# Patient Record
Sex: FEMALE | Race: WHITE | NOT HISPANIC OR LATINO | Employment: STUDENT | URBAN - METROPOLITAN AREA
[De-identification: names, ages, dates, MRNs, and addresses within clinical notes are randomized per-mention and may not be internally consistent; named-entity substitution may affect disease eponyms.]

---

## 2023-03-20 PROBLEM — N39.0 BACTERIAL UTI: Status: ACTIVE | Noted: 2023-03-20

## 2023-03-20 PROBLEM — R09.81 NASAL SINUS CONGESTION: Status: ACTIVE | Noted: 2023-03-20

## 2023-03-20 PROBLEM — N93.9 EPISODE OF HEAVY VAGINAL BLEEDING: Status: ACTIVE | Noted: 2023-03-20

## 2023-03-20 PROBLEM — S99.912A ANKLE INJURY, LEFT, INITIAL ENCOUNTER: Status: ACTIVE | Noted: 2023-03-20

## 2023-03-20 PROBLEM — R30.0 DYSURIA: Status: ACTIVE | Noted: 2023-03-20

## 2023-03-20 PROBLEM — H57.02 ANISOCORIA: Status: ACTIVE | Noted: 2023-03-20

## 2023-03-20 PROBLEM — H53.9 VISUAL DISTURBANCES: Status: ACTIVE | Noted: 2023-03-20

## 2023-03-20 PROBLEM — S93.409A ANKLE SPRAIN: Status: ACTIVE | Noted: 2023-03-20

## 2023-03-20 PROBLEM — K58.9 IBS (IRRITABLE BOWEL SYNDROME): Status: ACTIVE | Noted: 2023-03-20

## 2023-03-20 PROBLEM — R35.0 INCREASED FREQUENCY OF URINATION: Status: ACTIVE | Noted: 2023-03-20

## 2023-03-20 PROBLEM — H47.322 DRUSEN OF LEFT OPTIC DISC: Status: ACTIVE | Noted: 2023-03-20

## 2023-03-20 PROBLEM — R39.15 URINARY URGENCY: Status: ACTIVE | Noted: 2023-03-20

## 2023-03-20 PROBLEM — R39.9 UTI SYMPTOMS: Status: ACTIVE | Noted: 2023-03-20

## 2023-03-20 PROBLEM — A49.9 BACTERIAL UTI: Status: ACTIVE | Noted: 2023-03-20

## 2023-03-20 RX ORDER — BUPROPION HYDROCHLORIDE 150 MG/1
TABLET ORAL
COMMUNITY
Start: 2021-09-29 | End: 2023-08-16 | Stop reason: ALTCHOICE

## 2023-03-20 RX ORDER — CETIRIZINE HYDROCHLORIDE 10 MG/1
1 TABLET ORAL NIGHTLY
COMMUNITY
Start: 2020-08-31

## 2023-03-20 RX ORDER — FOLIC ACID 1 MG/1
TABLET ORAL
COMMUNITY

## 2023-03-20 RX ORDER — TOPIRAMATE 25 MG/1
2 TABLET ORAL 2 TIMES DAILY
COMMUNITY
Start: 2021-04-07 | End: 2023-08-16 | Stop reason: SDUPTHER

## 2023-03-20 RX ORDER — FLUOXETINE HYDROCHLORIDE 20 MG/1
1 CAPSULE ORAL DAILY
COMMUNITY
End: 2023-10-27 | Stop reason: ALTCHOICE

## 2023-03-20 RX ORDER — AZELASTINE 1 MG/ML
SPRAY, METERED NASAL
COMMUNITY
Start: 2020-07-15 | End: 2023-04-25

## 2023-03-20 RX ORDER — FLUTICASONE PROPIONATE 50 MCG
SPRAY, SUSPENSION (ML) NASAL
COMMUNITY
Start: 2021-07-19

## 2023-03-20 RX ORDER — SEGESTERONE ACETATE AND ETHINYL ESTRADIOL 103; 17.4 MG/1; MG/1
RING VAGINAL
COMMUNITY
Start: 2021-09-14 | End: 2024-05-09 | Stop reason: WASHOUT

## 2023-03-20 RX ORDER — TRAZODONE HYDROCHLORIDE 300 MG/1
TABLET ORAL
COMMUNITY

## 2023-04-21 DIAGNOSIS — J06.9 ACUTE URI: ICD-10-CM

## 2023-04-24 ENCOUNTER — TELEMEDICINE (OUTPATIENT)
Dept: PRIMARY CARE | Facility: CLINIC | Age: 28
End: 2023-04-24
Payer: COMMERCIAL

## 2023-04-24 DIAGNOSIS — H10.32 ACUTE CONJUNCTIVITIS OF LEFT EYE, UNSPECIFIED ACUTE CONJUNCTIVITIS TYPE: Primary | ICD-10-CM

## 2023-04-24 PROCEDURE — 99213 OFFICE O/P EST LOW 20 MIN: CPT | Performed by: STUDENT IN AN ORGANIZED HEALTH CARE EDUCATION/TRAINING PROGRAM

## 2023-04-24 RX ORDER — TOBRAMYCIN 3 MG/ML
1 SOLUTION/ DROPS OPHTHALMIC 4 TIMES DAILY
Qty: 5 ML | Refills: 0 | Status: SHIPPED | OUTPATIENT
Start: 2023-04-24 | End: 2023-08-16 | Stop reason: ALTCHOICE

## 2023-04-24 NOTE — PROGRESS NOTES
"Subjective   Patient ID: Holli Bales is a 27 y.o. female who presents for Conjunctivitis.    HPI   Over the past 24 hours she started to notice left eye irritation  This morning she noted severe redness within her conjunctiva of the left eye as well as her eye being \"crusted shut \"    Stated that she did sleep in her contacts on Saturday night and has had some minor irritation    Overall, denies any difficulty seeing or any other acute signs/symptoms    Asking to be further evaluate/treated as she believes this is slightly worsening    Review of Systems  All pertinent positive symptoms are included in the history of present illness.    All other systems have been reviewed and are negative and noncontributory to this patient's current ailments.    Objective   There were no vitals taken for this visit.    Physical Exam  CONSTITUTIONAL - well nourished, well developed, looks like stated age, in no acute distress, not ill-appearing, and not tired appearing  SKIN - normal skin color and pigmentation, normal skin turgor without rash, lesions, or nodules visualized  HEAD - no trauma, normocephalic  EYES - normal external exam but conjunctiva of left eye very erythematous/irritated, noted some minor drainage and minor crusting  CHEST -no distressed breathing, good effort  EXTREMITIES - no edema, no deformities  NEUROLOGICAL - normal balance, normal motor, no ataxia  PSYCHIATRIC - alert, pleasant and cordial, age-appropriate    Assessment/Plan   Due to your appearance and your signs/symptoms I did provide tobramycin to be taken 4 times daily for the next 7-10 days    Please perform this at least 1 day after you are fully healed    Risks, benefits, and options of medication(s) above were discussed with instructions on the medication usage for underlying medical ailment(s)    I suggested changing pillow linens for at least the next 2 nights, making certain that proper hygiene is followed including washing of the hands, and " using medication as instructed    I encouraged supportive care such as rest, fluids and Advil/Tylenol as warranted    Notify me in 3 to 4 days or sooner if symptoms worsen or persist as we will then consider ophthalmology consultation

## 2023-04-25 RX ORDER — AZELASTINE 1 MG/ML
1 SPRAY, METERED NASAL 2 TIMES DAILY
Qty: 12 ML | Refills: 0 | Status: SHIPPED | OUTPATIENT
Start: 2023-04-25 | End: 2024-04-18

## 2023-08-12 ASSESSMENT — PROMIS GLOBAL HEALTH SCALE
EMOTIONAL_PROBLEMS: OFTEN
RATE_AVERAGE_PAIN: 3
RATE_SOCIAL_SATISFACTION: VERY GOOD
CARRYOUT_PHYSICAL_ACTIVITIES: MOSTLY
RATE_GENERAL_HEALTH: GOOD
CARRYOUT_SOCIAL_ACTIVITIES: VERY GOOD
RATE_QUALITY_OF_LIFE: GOOD
RATE_AVERAGE_FATIGUE: SEVERE
RATE_PHYSICAL_HEALTH: GOOD
RATE_MENTAL_HEALTH: FAIR

## 2023-08-16 ENCOUNTER — OFFICE VISIT (OUTPATIENT)
Dept: PRIMARY CARE | Facility: CLINIC | Age: 28
End: 2023-08-16
Payer: COMMERCIAL

## 2023-08-16 VITALS
BODY MASS INDEX: 20.69 KG/M2 | DIASTOLIC BLOOD PRESSURE: 64 MMHG | HEART RATE: 73 BPM | HEIGHT: 65 IN | SYSTOLIC BLOOD PRESSURE: 96 MMHG | WEIGHT: 124.2 LBS

## 2023-08-16 DIAGNOSIS — G43.109 MIGRAINE WITH AURA AND WITHOUT STATUS MIGRAINOSUS, NOT INTRACTABLE: ICD-10-CM

## 2023-08-16 DIAGNOSIS — F33.1 MAJOR DEPRESSIVE DISORDER, RECURRENT, MODERATE (MULTI): Chronic | ICD-10-CM

## 2023-08-16 DIAGNOSIS — Z00.00 HEALTHCARE MAINTENANCE: Primary | ICD-10-CM

## 2023-08-16 LAB
POC APPEARANCE, URINE: CLEAR
POC BILIRUBIN, URINE: NEGATIVE
POC BLOOD, URINE: NEGATIVE
POC COLOR, URINE: NORMAL
POC GLUCOSE, URINE: NEGATIVE MG/DL
POC KETONES, URINE: NEGATIVE MG/DL
POC LEUKOCYTES, URINE: NEGATIVE
POC NITRITE,URINE: NEGATIVE
POC PH, URINE: 6 PH
POC PROTEIN, URINE: NEGATIVE MG/DL
POC SPECIFIC GRAVITY, URINE: 1.02
POC UROBILINOGEN, URINE: 0.2 EU/DL

## 2023-08-16 PROCEDURE — 1036F TOBACCO NON-USER: CPT | Performed by: STUDENT IN AN ORGANIZED HEALTH CARE EDUCATION/TRAINING PROGRAM

## 2023-08-16 PROCEDURE — 99395 PREV VISIT EST AGE 18-39: CPT | Performed by: STUDENT IN AN ORGANIZED HEALTH CARE EDUCATION/TRAINING PROGRAM

## 2023-08-16 PROCEDURE — 81002 URINALYSIS NONAUTO W/O SCOPE: CPT | Performed by: STUDENT IN AN ORGANIZED HEALTH CARE EDUCATION/TRAINING PROGRAM

## 2023-08-16 PROCEDURE — 99213 OFFICE O/P EST LOW 20 MIN: CPT | Performed by: STUDENT IN AN ORGANIZED HEALTH CARE EDUCATION/TRAINING PROGRAM

## 2023-08-16 RX ORDER — TOPIRAMATE 50 MG/1
50 TABLET, FILM COATED ORAL 2 TIMES DAILY
Qty: 180 TABLET | Refills: 1 | Status: SHIPPED | OUTPATIENT
Start: 2023-08-16 | End: 2024-01-05 | Stop reason: SDUPTHER

## 2023-08-16 RX ORDER — BUPROPION HYDROCHLORIDE 300 MG/1
1 TABLET ORAL
COMMUNITY
Start: 2023-02-17

## 2023-08-16 RX ORDER — FLUOXETINE 10 MG/1
10 CAPSULE ORAL
COMMUNITY
Start: 2023-04-20 | End: 2023-10-27 | Stop reason: ALTCHOICE

## 2023-08-16 NOTE — PROGRESS NOTES
Subjective   Patient ID: Holli Bales is a 27 y.o. female who presents for Annual Exam.  Today she is accompanied by alone.     HPI  1.  Health maintenance  Overall patient is doing well.   Immunization: Tdap 2022  COVID-19 vaccine (Pfizer Moderna) x3  Colon Cancer Screening: No family history  OB/GYN: Sees a nurse practitioner, last appointment August 2022 with her last Pap in 2020  Diet: Working on diet  Exercise: Attempting to exercise more frequently via walking  Tobacco: Denies use  EtOH: Rarely/socially  Not fasting but willing to do blood work in the near future    2.  Depression  Continues to follow-up with psychiatry  Continues to take Wellbutrin 300 mg as well as Prozac 30 mg daily as indicated the chart  Denies any HI/SI  Her most recent appointment was in June of this year  Feels stable in this regard    3.  Migraine  Continues Topamax/topiramate 50 mg twice daily  Feels very well at this current medication  Requesting refills as she feels well    Current Outpatient Medications on File Prior to Visit   Medication Sig Dispense Refill    buPROPion XL (Wellbutrin XL) 300 mg 24 hr tablet Take 1 tablet (300 mg) by mouth once daily in the morning. Take before meals.      FLUoxetine (PROzac) 10 mg capsule Take 1 capsule (10 mg) by mouth once daily.      azelastine (Astelin) 137 mcg (0.1 %) nasal spray Administer 1 spray into each nostril in the morning and 1 spray before bedtime. 12 mL 0    cetirizine (ZyrTEC) 10 mg tablet Take 1 tablet (10 mg) by mouth once daily at bedtime.      FLUoxetine (PROzac) 20 mg capsule Take 1 capsule (20 mg) by mouth once daily.      fluticasone (Flonase) 50 mcg/actuation nasal spray 1 spay in each nostril daily for a week; then do 2 sprays in each nostril daily      folic acid (Folvite) 1 mg tablet Take Two (2) tablets daily      segesterone ac-ethin estradioL (Annovera) 0.15-0.013 mg/24 hour ring Use as directed. In vagina for three weeks, out for one.  Repeat monthly       "traZODone (Desyrel) 300 mg tablet traZODone HCl TABS   Refills: 0       Active      [DISCONTINUED] buPROPion XL (Wellbutrin XL) 150 mg 24 hr tablet buPROPion HCl ER (XL) 150 MG Oral Tablet Extended Release 24 Hour   Quantity: 30  Refills: 0        Start : 29-Sep-2021   Active      [DISCONTINUED] tobramycin (Tobrex) 0.3 % ophthalmic solution Administer 1 drop into the left eye in the morning and 1 drop at noon and 1 drop in the evening and 1 drop before bedtime. 5 mL 0    [DISCONTINUED] topiramate (Topamax) 25 mg tablet Take 2 tablets (50 mg) by mouth in the morning and 2 tablets (50 mg) before bedtime.       No current facility-administered medications on file prior to visit.        Allergies   Allergen Reactions    Lactase Diarrhea and Hives       Immunization History   Administered Date(s) Administered    HPV, Quadrivalent 11/11/2011, 12/14/2011, 05/30/2012    Influenza, seasonal, injectable 09/23/2020    Meningococcal, Unknown Serogroups 08/10/2016    Moderna SARS-CoV-2 Vaccination 04/02/2021, 05/03/2021, 12/15/2021    Tdap vaccine, age 10 years and older (BOOSTRIX) 06/13/2022    Varicella vaccine, subcutaneous (VARIVAX) 09/21/2010         Review of Systems  All pertinent positive symptoms are included in the history of present illness.  All other systems have been reviewed and are negative and noncontributory to this patient's current ailments.     Objective   BP 96/64 (BP Location: Left arm, Patient Position: Sitting, BP Cuff Size: Adult)   Pulse 73   Ht 1.651 m (5' 5\")   Wt 56.3 kg (124 lb 3.2 oz)   BMI 20.67 kg/m²   BSA: 1.61 meters squared      Physical Exam  CONSTITUTIONAL - well nourished, well developed, looks like stated age, in no acute distress, not ill-appearing, and not tired appearing  SKIN - normal skin color and pigmentation, normal skin turgor without rash, lesions, or nodules visualized  HEAD - no trauma, normocephalic  EYES - normal external exam  ENT - TM's intact, no injection, no signs " of infection, uvula midline, normal tongue movement and throat normal, no exudate, nasal passage without discharge and patent  NECK - supple without rigidity, no neck mass was observed, no thyromegaly or thyroid nodules  CHEST - clear to auscultation, no wheezing, no crackles and no rales, good effort  CARDIAC - regular rate and regular rhythm, no skipped beats, no murmur  ABDOMEN - no organomegaly, soft, nontender, nondistended, normal bowel sounds, no guarding/rebound/rigidity, negative McBurney sign and negative Sherman sign  EXTREMITIES - no edema, no deformities  NEUROLOGICAL - normal gait, normal balance, normal motor, no ataxia  PSYCHIATRIC - alert, pleasant and cordial, age-appropriate  IMMUNOLOGIC - no cervical lymphadenopathy     Assessment/Plan   1.  Health maintenance  Complete history and physical examination was performed  EKG was not performed  We will notify of test results once available and make treatment recommendations accordingly  Please continue following up with your OB/GYN for her well woman visits  Also please attempt to eat a well-balanced diet exercise regularly    2.  Depression  Continue following up with your psychiatrist  Stable without any changes recommended  Continue Wellbutrin 300 mg alongside Prozac 30 mg    3.  Migraine  Stable.  No changes recommended this time  Continue topiramate 50 mg twice daily  If this worsens we will get you back to your neurologist    Otherwise, please follow-up in 6 months for continued care

## 2023-08-22 ENCOUNTER — LAB (OUTPATIENT)
Dept: LAB | Facility: LAB | Age: 28
End: 2023-08-22
Payer: COMMERCIAL

## 2023-08-22 DIAGNOSIS — F33.1 MAJOR DEPRESSIVE DISORDER, RECURRENT, MODERATE (MULTI): Chronic | ICD-10-CM

## 2023-08-22 LAB
ALANINE AMINOTRANSFERASE (SGPT) (U/L) IN SER/PLAS: 6 U/L (ref 7–45)
ALBUMIN (G/DL) IN SER/PLAS: 4.2 G/DL (ref 3.4–5)
ALKALINE PHOSPHATASE (U/L) IN SER/PLAS: 61 U/L (ref 33–110)
ANION GAP IN SER/PLAS: 11 MMOL/L (ref 10–20)
ASPARTATE AMINOTRANSFERASE (SGOT) (U/L) IN SER/PLAS: 10 U/L (ref 9–39)
BASOPHILS (10*3/UL) IN BLOOD BY AUTOMATED COUNT: 0.07 X10E9/L (ref 0–0.1)
BASOPHILS/100 LEUKOCYTES IN BLOOD BY AUTOMATED COUNT: 1.3 % (ref 0–2)
BILIRUBIN TOTAL (MG/DL) IN SER/PLAS: 0.4 MG/DL (ref 0–1.2)
CALCIUM (MG/DL) IN SER/PLAS: 9.4 MG/DL (ref 8.6–10.3)
CARBON DIOXIDE, TOTAL (MMOL/L) IN SER/PLAS: 23 MMOL/L (ref 21–32)
CHLORIDE (MMOL/L) IN SER/PLAS: 109 MMOL/L (ref 98–107)
CHOLESTEROL (MG/DL) IN SER/PLAS: 151 MG/DL (ref 0–199)
CHOLESTEROL IN HDL (MG/DL) IN SER/PLAS: 77.7 MG/DL
CHOLESTEROL/HDL RATIO: 1.9
CREATININE (MG/DL) IN SER/PLAS: 0.83 MG/DL (ref 0.5–1.05)
EOSINOPHILS (10*3/UL) IN BLOOD BY AUTOMATED COUNT: 0.07 X10E9/L (ref 0–0.7)
EOSINOPHILS/100 LEUKOCYTES IN BLOOD BY AUTOMATED COUNT: 1.3 % (ref 0–6)
ERYTHROCYTE DISTRIBUTION WIDTH (RATIO) BY AUTOMATED COUNT: 14.3 % (ref 11.5–14.5)
ERYTHROCYTE MEAN CORPUSCULAR HEMOGLOBIN CONCENTRATION (G/DL) BY AUTOMATED: 31.1 G/DL (ref 32–36)
ERYTHROCYTE MEAN CORPUSCULAR VOLUME (FL) BY AUTOMATED COUNT: 82 FL (ref 80–100)
ERYTHROCYTES (10*6/UL) IN BLOOD BY AUTOMATED COUNT: 4.93 X10E12/L (ref 4–5.2)
GFR FEMALE: >90 ML/MIN/1.73M2
GLUCOSE (MG/DL) IN SER/PLAS: 84 MG/DL (ref 74–99)
HEMATOCRIT (%) IN BLOOD BY AUTOMATED COUNT: 40.5 % (ref 36–46)
HEMOGLOBIN (G/DL) IN BLOOD: 12.6 G/DL (ref 12–16)
IMMATURE GRANULOCYTES/100 LEUKOCYTES IN BLOOD BY AUTOMATED COUNT: 0.2 % (ref 0–0.9)
LDL: 48 MG/DL (ref 0–119)
LEUKOCYTES (10*3/UL) IN BLOOD BY AUTOMATED COUNT: 5.5 X10E9/L (ref 4.4–11.3)
LYMPHOCYTES (10*3/UL) IN BLOOD BY AUTOMATED COUNT: 1.93 X10E9/L (ref 1.2–4.8)
LYMPHOCYTES/100 LEUKOCYTES IN BLOOD BY AUTOMATED COUNT: 34.9 % (ref 13–44)
MONOCYTES (10*3/UL) IN BLOOD BY AUTOMATED COUNT: 0.4 X10E9/L (ref 0.1–1)
MONOCYTES/100 LEUKOCYTES IN BLOOD BY AUTOMATED COUNT: 7.2 % (ref 2–10)
NEUTROPHILS (10*3/UL) IN BLOOD BY AUTOMATED COUNT: 3.05 X10E9/L (ref 1.2–7.7)
NEUTROPHILS/100 LEUKOCYTES IN BLOOD BY AUTOMATED COUNT: 55.1 % (ref 40–80)
PLATELETS (10*3/UL) IN BLOOD AUTOMATED COUNT: 255 X10E9/L (ref 150–450)
POTASSIUM (MMOL/L) IN SER/PLAS: 4.2 MMOL/L (ref 3.5–5.3)
PROTEIN TOTAL: 7 G/DL (ref 6.4–8.2)
SODIUM (MMOL/L) IN SER/PLAS: 139 MMOL/L (ref 136–145)
THYROTROPIN (MIU/L) IN SER/PLAS BY DETECTION LIMIT <= 0.05 MIU/L: 0.85 MIU/L (ref 0.44–3.98)
TRIGLYCERIDE (MG/DL) IN SER/PLAS: 128 MG/DL (ref 0–149)
UREA NITROGEN (MG/DL) IN SER/PLAS: 11 MG/DL (ref 6–23)
VLDL: 26 MG/DL (ref 0–40)

## 2023-08-22 PROCEDURE — 83036 HEMOGLOBIN GLYCOSYLATED A1C: CPT

## 2023-08-22 PROCEDURE — 85025 COMPLETE CBC W/AUTO DIFF WBC: CPT

## 2023-08-22 PROCEDURE — 84443 ASSAY THYROID STIM HORMONE: CPT

## 2023-08-22 PROCEDURE — 80061 LIPID PANEL: CPT

## 2023-08-22 PROCEDURE — 36415 COLL VENOUS BLD VENIPUNCTURE: CPT

## 2023-08-22 PROCEDURE — 80053 COMPREHEN METABOLIC PANEL: CPT

## 2023-08-23 LAB
ESTIMATED AVERAGE GLUCOSE FOR HBA1C: 108 MG/DL
HEMOGLOBIN A1C/HEMOGLOBIN TOTAL IN BLOOD: 5.4 %

## 2023-08-23 NOTE — RESULT ENCOUNTER NOTE
Sugar, kidneys, electrolytes are all within normal limits    Liver function one-point below normal but I am not concerned and chloride 2 points above normal which again I am not concerned    Complete blood cell count shows no anemia    Cholesterol looks good at 151, HDL 77, LDL 48, triglycerides 128    Thyroid well within normal limits    We are just waiting for the hemoglobin A1c at this time

## 2023-08-24 ENCOUNTER — TELEPHONE (OUTPATIENT)
Dept: PRIMARY CARE | Facility: CLINIC | Age: 28
End: 2023-08-24
Payer: COMMERCIAL

## 2023-10-24 DIAGNOSIS — J32.0 CHRONIC MAXILLARY SINUSITIS: Primary | ICD-10-CM

## 2023-10-25 RX ORDER — MONTELUKAST SODIUM 10 MG/1
10 TABLET ORAL DAILY
Qty: 90 TABLET | Refills: 3 | Status: SHIPPED | OUTPATIENT
Start: 2023-10-25

## 2023-10-27 ENCOUNTER — OFFICE VISIT (OUTPATIENT)
Dept: OBSTETRICS AND GYNECOLOGY | Facility: CLINIC | Age: 28
End: 2023-10-27
Payer: COMMERCIAL

## 2023-10-27 VITALS
WEIGHT: 129 LBS | BODY MASS INDEX: 21.49 KG/M2 | HEIGHT: 65 IN | SYSTOLIC BLOOD PRESSURE: 108 MMHG | DIASTOLIC BLOOD PRESSURE: 78 MMHG

## 2023-10-27 DIAGNOSIS — B37.31 ACUTE CANDIDIASIS OF VULVA AND VAGINA: Primary | ICD-10-CM

## 2023-10-27 DIAGNOSIS — Z11.3 SCREEN FOR STD (SEXUALLY TRANSMITTED DISEASE): ICD-10-CM

## 2023-10-27 DIAGNOSIS — N89.8 VAGINAL DISCHARGE: ICD-10-CM

## 2023-10-27 PROCEDURE — 1036F TOBACCO NON-USER: CPT | Performed by: OBSTETRICS & GYNECOLOGY

## 2023-10-27 PROCEDURE — 99203 OFFICE O/P NEW LOW 30 MIN: CPT | Performed by: OBSTETRICS & GYNECOLOGY

## 2023-10-27 PROCEDURE — 87205 SMEAR GRAM STAIN: CPT

## 2023-10-27 PROCEDURE — 87591 N.GONORRHOEAE DNA AMP PROB: CPT

## 2023-10-27 PROCEDURE — 87661 TRICHOMONAS VAGINALIS AMPLIF: CPT

## 2023-10-27 PROCEDURE — 87491 CHLMYD TRACH DNA AMP PROBE: CPT

## 2023-10-27 RX ORDER — FLUCONAZOLE 150 MG/1
150 TABLET ORAL ONCE
Qty: 1 TABLET | Refills: 0 | Status: SHIPPED | OUTPATIENT
Start: 2023-10-27 | End: 2023-10-27

## 2023-10-27 RX ORDER — FLUOXETINE HYDROCHLORIDE 40 MG/1
40 CAPSULE ORAL DAILY
COMMUNITY

## 2023-10-27 NOTE — PROGRESS NOTES
"SUBJECTIVE  Holli Bales is a 28 y.o. female here for gyn problem  C/o vaginal discharge for 2 weeks  The patient has never been pregnant.    Gyn:   Menstrual Pattern: normal   STIs: denies  Sexual Activity: men, monogamous, no complaints  Contraception: None    [unfilled]  Past Medical History:   Diagnosis Date    Personal history of diseases of the skin and subcutaneous tissue 06/23/2016    History of urticaria    Personal history of other mental and behavioral disorders     History of depression    Personal history of other mental and behavioral disorders 06/23/2016    History of anxiety disorder    Personal history of other specified conditions     History of shortness of breath    Personal history of other specified conditions 06/23/2016    History of headache      Past Surgical History:   Procedure Laterality Date    ADENOIDECTOMY  06/03/2015    Adenoidectomy    MOUTH SURGERY  06/03/2016    Oral Surgery Tooth Extraction    TONSILLECTOMY      TYMPANOSTOMY TUBE PLACEMENT  06/03/2016    Ear Pressure Equalization Tube        OBJECTIVE  /78   Ht 1.651 m (5' 5\")   Wt 58.5 kg (129 lb)   LMP 10/01/2023 (Exact Date)   Breastfeeding No   BMI 21.47 kg/m²      General:   Alert and oriented, in no acute distress   Neck: Supple. No visible thyromegaly.    Breast/Axilla: Normal to palpation bilaterally without masses, skin changes, or nipple discharge.    Abdomen: Soft, non-tender, without masses or organomegaly   Vulva: Normal architecture without erythema, masses, or lesions.    Vagina: Normal mucosa without lesions, masses, or atrophy. No abnormal vaginal discharge.    Cervix: Normal without masses, lesions, or signs of cervicitis.    Uterus: Normal mobile, non-enlarged uterus    Adnexa: Normal without masses or lesions   Pelvic Floor No POP noted. No high tone pelvic floor    Psych Normal affect. Normal mood.      Problem List Items Addressed This Visit       Acute candidiasis of vulva and vagina - Primary    " Relevant Medications    fluconazole (Diflucan) 150 mg tablet      IMPRESSIONS:  Vaginal discharge  Gram stain and GC/CT/Trichomonas testing ordered  F/u for annual exam.    Marguerite Rueda MD

## 2023-10-28 LAB
C TRACH RRNA SPEC QL NAA+PROBE: NEGATIVE
N GONORRHOEA DNA SPEC QL PROBE+SIG AMP: NEGATIVE
T VAGINALIS RRNA SPEC QL NAA+PROBE: NEGATIVE

## 2023-10-30 LAB
BACTERIAL VAGINOSIS VAG-IMP: ABNORMAL
CLUE CELLS VAG LPF-#/AREA: ABNORMAL /[LPF]
NUGENT SCORE: 5
YEAST VAG WET PREP-#/AREA: PRESENT

## 2023-12-26 ENCOUNTER — APPOINTMENT (OUTPATIENT)
Dept: OBSTETRICS AND GYNECOLOGY | Facility: CLINIC | Age: 28
End: 2023-12-26

## 2023-12-26 ENCOUNTER — APPOINTMENT (OUTPATIENT)
Dept: OBSTETRICS AND GYNECOLOGY | Facility: CLINIC | Age: 28
End: 2023-12-26
Payer: COMMERCIAL

## 2024-01-02 ENCOUNTER — APPOINTMENT (OUTPATIENT)
Dept: OBSTETRICS AND GYNECOLOGY | Facility: CLINIC | Age: 29
End: 2024-01-02
Payer: COMMERCIAL

## 2024-01-05 ENCOUNTER — TELEMEDICINE (OUTPATIENT)
Dept: PRIMARY CARE | Facility: CLINIC | Age: 29
End: 2024-01-05
Payer: COMMERCIAL

## 2024-01-05 DIAGNOSIS — G43.109 MIGRAINE WITH AURA AND WITHOUT STATUS MIGRAINOSUS, NOT INTRACTABLE: ICD-10-CM

## 2024-01-05 PROCEDURE — 99213 OFFICE O/P EST LOW 20 MIN: CPT | Performed by: STUDENT IN AN ORGANIZED HEALTH CARE EDUCATION/TRAINING PROGRAM

## 2024-01-05 RX ORDER — TOPIRAMATE 50 MG/1
50 TABLET, FILM COATED ORAL 2 TIMES DAILY
Qty: 180 TABLET | Refills: 1 | Status: SHIPPED | OUTPATIENT
Start: 2024-01-05

## 2024-01-05 RX ORDER — OMEPRAZOLE 20 MG/1
CAPSULE, DELAYED RELEASE ORAL
COMMUNITY
Start: 2023-08-17

## 2024-01-05 RX ORDER — FLUCONAZOLE 150 MG/1
TABLET ORAL
COMMUNITY
Start: 2023-10-31 | End: 2024-05-09 | Stop reason: WASHOUT

## 2024-01-05 ASSESSMENT — PATIENT HEALTH QUESTIONNAIRE - PHQ9
2. FEELING DOWN, DEPRESSED OR HOPELESS: NOT AT ALL
1. LITTLE INTEREST OR PLEASURE IN DOING THINGS: NOT AT ALL
SUM OF ALL RESPONSES TO PHQ9 QUESTIONS 1 AND 2: 0

## 2024-01-05 NOTE — PROGRESS NOTES
Subjective   Patient ID: Holli Bales is a 28 y.o. female who presents for Migraine.  Today she is accompanied by alone.     Migraine       1. Migraine  Continues Topamax/topiramate 50 mg twice daily  Feels very well at this current medication  Requesting refills  No issues at all and is happy with his current regimen    Current Outpatient Medications on File Prior to Visit   Medication Sig Dispense Refill    fluconazole (Diflucan) 150 mg tablet TAKE 1 TAB AS DIRECTED      omeprazole (PriLOSEC) 20 mg DR capsule TAKE 1 CAPSULE BY MOUTH EVERY DAY 30 MINUTES BEFORE MORNING MEAL FOR 30 DAYS      azelastine (Astelin) 137 mcg (0.1 %) nasal spray Administer 1 spray into each nostril in the morning and 1 spray before bedtime. 12 mL 0    buPROPion XL (Wellbutrin XL) 300 mg 24 hr tablet Take 1 tablet (300 mg) by mouth once daily in the morning. Take before meals.      cetirizine (ZyrTEC) 10 mg tablet Take 1 tablet (10 mg) by mouth once daily at bedtime.      FLUoxetine (PROzac) 40 mg capsule Take 1 capsule (40 mg) by mouth once daily.      fluticasone (Flonase) 50 mcg/actuation nasal spray 1 spay in each nostril daily for a week; then do 2 sprays in each nostril daily      folic acid (Folvite) 1 mg tablet Take Two (2) tablets daily      montelukast (Singulair) 10 mg tablet TAKE 1 TABLET BY MOUTH EVERY DAY 90 tablet 3    segesterone ac-ethin estradioL (Annovera) 0.15-0.013 mg/24 hour ring Use as directed. In vagina for three weeks, out for one.  Repeat monthly      traZODone (Desyrel) 300 mg tablet traZODone HCl TABS   Refills: 0       Active      [DISCONTINUED] topiramate (Topamax) 50 mg tablet Take 1 tablet (50 mg) by mouth 2 times a day. 180 tablet 1     No current facility-administered medications on file prior to visit.        Allergies   Allergen Reactions    Lactase Diarrhea and Hives       Immunization History   Administered Date(s) Administered    HPV, Quadrivalent 11/11/2011, 12/14/2011, 05/30/2012    Influenza,  seasonal, injectable 09/23/2020    Meningococcal, Unknown Serogroups 08/10/2016    Moderna SARS-CoV-2 Vaccination 04/02/2021, 05/03/2021, 12/15/2021    Tdap vaccine, age 7 year and older (BOOSTRIX) 06/13/2022    Varicella vaccine, subcutaneous (VARIVAX) 09/21/2010         Review of Systems  All pertinent positive symptoms are included in the history of present illness.  All other systems have been reviewed and are negative and noncontributory to this patient's current ailments.     Objective   There were no vitals taken for this visit.  BSA: There is no height or weight on file to calculate BSA.      Physical Exam  CONSTITUTIONAL - well nourished, well developed, looks like stated age, in no acute distress, not ill-appearing, and not tired appearing  SKIN - normal skin color and pigmentation, normal skin turgor without rash, lesions, or nodules visualized  HEAD - no trauma, normocephalic  EYES - normal external exam  CHEST -no distressed breathing, good effort  EXTREMITIES - no edema, no deformities  NEUROLOGICAL - normal balance, normal motor, no ataxia  PSYCHIATRIC - alert, pleasant and cordial, age-appropriate    Assessment/Plan   1. Migraine  Stable.  No changes recommended this time  Continue topiramate 50 mg twice daily  Please continue monitoring closely  If this worsens we will have to discuss following back up with a neurologist    Otherwise, please follow-up in 6 months for continued care as well as your yearly physical examination

## 2024-03-13 ENCOUNTER — OFFICE VISIT (OUTPATIENT)
Dept: PRIMARY CARE | Facility: CLINIC | Age: 29
End: 2024-03-13
Payer: COMMERCIAL

## 2024-03-13 VITALS
WEIGHT: 129 LBS | BODY MASS INDEX: 21.47 KG/M2 | SYSTOLIC BLOOD PRESSURE: 102 MMHG | DIASTOLIC BLOOD PRESSURE: 66 MMHG | HEART RATE: 106 BPM

## 2024-03-13 DIAGNOSIS — I65.23 CALCIFICATION OF BOTH CAROTID ARTERIES: Primary | ICD-10-CM

## 2024-03-13 PROCEDURE — 1036F TOBACCO NON-USER: CPT | Performed by: STUDENT IN AN ORGANIZED HEALTH CARE EDUCATION/TRAINING PROGRAM

## 2024-03-13 PROCEDURE — 99213 OFFICE O/P EST LOW 20 MIN: CPT | Performed by: STUDENT IN AN ORGANIZED HEALTH CARE EDUCATION/TRAINING PROGRAM

## 2024-03-13 ASSESSMENT — PATIENT HEALTH QUESTIONNAIRE - PHQ9
SUM OF ALL RESPONSES TO PHQ9 QUESTIONS 1 AND 2: 0
2. FEELING DOWN, DEPRESSED OR HOPELESS: NOT AT ALL
1. LITTLE INTEREST OR PLEASURE IN DOING THINGS: NOT AT ALL

## 2024-03-13 NOTE — PROGRESS NOTES
Subjective   Patient ID: Holli Bales is a 28 y.o. female who presents for Calcium Deposits.    HPI   Patient is following up into the office today to discuss calcium deposits/calcification within her carotid arteries    Approximately 6 months ago she did follow-up with a dentist who tried to contact her multiple times to state that she has some minor calcifications within her carotid arteries    She is completely asymptomatic and has an x-ray to show myself today    Upon visualization we discussed that there may be some slight calcification within bilateral carotid arteries but I am not fully concerned as this could even be artifact or even from somewhere else    Wishes to discuss this further as she feels completely fine and wants to follow-up appropriately    Review of Systems  All pertinent positive symptoms are included in the history of present illness.    All other systems have been reviewed and are negative and noncontributory to this patient's current ailments.    Objective   /66 (BP Location: Left arm, Patient Position: Sitting, BP Cuff Size: Adult)   Pulse 106   Wt 58.5 kg (129 lb)   BMI 21.47 kg/m²     Physical Exam  CONSTITUTIONAL - well nourished, well developed, looks like stated age, in no acute distress, not ill-appearing, and not tired appearing  SKIN - normal skin color and pigmentation, normal skin turgor without rash, lesions, or nodules visualized  HEAD - no trauma, normocephalic  EYES - normal external exam  CHEST -no distressed breathing, good effort, heart regular rate and rhythm, no murmurs, rubs, or gallops, no carotid bruits noted  EXTREMITIES - no edema, no deformities  NEUROLOGICAL - normal balance, normal motor, no ataxia  PSYCHIATRIC - alert, pleasant and cordial, age-appropriate    Assessment/Plan   1.  Calcification of both carotid arteries    After visualization of the x-ray I am not fully concerned  To be fully thorough, I did order an ultrasound of your carotid arteries  to be done at your earliest major convenience    Will notify the results and make recommendations accordingly

## 2024-03-15 ENCOUNTER — HOSPITAL ENCOUNTER (OUTPATIENT)
Dept: RADIOLOGY | Facility: CLINIC | Age: 29
Discharge: HOME | End: 2024-03-15
Payer: COMMERCIAL

## 2024-03-15 DIAGNOSIS — I65.23 CALCIFICATION OF BOTH CAROTID ARTERIES: ICD-10-CM

## 2024-03-15 PROCEDURE — 93880 EXTRACRANIAL BILAT STUDY: CPT | Performed by: RADIOLOGY

## 2024-03-15 PROCEDURE — 93880 EXTRACRANIAL BILAT STUDY: CPT

## 2024-03-17 NOTE — RESULT ENCOUNTER NOTE
Carotid ultrasound showed normal flow without any evidence of stenosis or any plaque within the visualized portion of the carotid circulation    No calcifications visualized

## 2024-04-15 NOTE — PROGRESS NOTES
"Holli Bales is a 28 y.o. female who is here for a routine exam. PCP = Ante T Pletikosic, DO  Tried OCP, xulane patch and vaginal ring , unable to tolerate  APE Concerns: none    Other Concerns: none  Preventative:  Seat Belt Use: always  LPAP: 2020  GynHx:  Periods are regular every 28-30 days, lasting 7 days.  Dysmenorrhea: severe, occurring first 1-2 days of flow.   Cyclic symptoms include none.    Social History     Substance and Sexual Activity   Sexual Activity Not Currently    Partners: Male     Current contraception: None  STIs: none  Last PAP:       SoHx:  Substance:   Tobacco Use: Low Risk  (4/18/2024)    Patient History     Smoking Tobacco Use: Never     Smokeless Tobacco Use: Never     Passive Exposure: Not on file      Social History     Substance and Sexual Activity   Drug Use Never      Social History     Substance and Sexual Activity   Alcohol Use None    Comment: social       Past Medical History:   Diagnosis Date    Personal history of diseases of the skin and subcutaneous tissue 06/23/2016    History of urticaria    Personal history of other mental and behavioral disorders     History of depression    Personal history of other mental and behavioral disorders 06/23/2016    History of anxiety disorder    Personal history of other specified conditions     History of shortness of breath    Personal history of other specified conditions 06/23/2016    History of headache      Past Surgical History:   Procedure Laterality Date    ADENOIDECTOMY  06/03/2015    Adenoidectomy    MOUTH SURGERY  06/03/2016    Oral Surgery Tooth Extraction    TONSILLECTOMY      TYMPANOSTOMY TUBE PLACEMENT  06/03/2016    Ear Pressure Equalization Tube      Objective   /72   Ht 1.676 m (5' 6\")   Wt 61.7 kg (136 lb)   LMP 04/01/2024 (Approximate)   Breastfeeding No   BMI 21.95 kg/m²      General:   Alert and oriented, in no acute distress   Neck: Supple. No visible thyromegaly.    Breast/Axilla: Normal to palpation " bilaterally without masses, skin changes, or nipple discharge.    Abdomen: Soft, non-tender, without masses or organomegaly   Vulva: Normal architecture without erythema, masses, or lesions.    Vagina: Normal mucosa without lesions, masses, or atrophy. Noted abnormal vaginal discharge.    Cervix: Normal without masses, lesions, or signs of cervicitis.    Uterus: Normal mobile, non-enlarged uterus    Adnexa: Normal without masses or lesions   Pelvic Floor No POP noted. No high tone pelvic floor    Psych Normal affect. Normal mood.      Problem List Items Addressed This Visit       Encounter for gynecological examination with abnormal finding - Primary    Vaginal discharge      Assessment/Plan    Thank you for coming to your annual exam. Your findings during the exam were normal. Specific topics addressed during this exam included: healthy lifestyle, well woman screening guidelines,  Healthy diet with high fiber, Weight bearing exercise 3 to 5 times a week, Multivitamins and calcium     Actions performed during this visit include:  - Clinical breast exam  - Clinical pelvic exam  -LARC reviewed  - PAP ordered  GC/CT and  gram stain ordered  - No orders of the defined types were placed in this encounter.       Please return for your next visit in 1 year.

## 2024-04-18 ENCOUNTER — OFFICE VISIT (OUTPATIENT)
Dept: OBSTETRICS AND GYNECOLOGY | Facility: CLINIC | Age: 29
End: 2024-04-18
Payer: COMMERCIAL

## 2024-04-18 VITALS
SYSTOLIC BLOOD PRESSURE: 100 MMHG | BODY MASS INDEX: 21.86 KG/M2 | WEIGHT: 136 LBS | HEIGHT: 66 IN | DIASTOLIC BLOOD PRESSURE: 72 MMHG

## 2024-04-18 DIAGNOSIS — Z11.3 SCREEN FOR STD (SEXUALLY TRANSMITTED DISEASE): ICD-10-CM

## 2024-04-18 DIAGNOSIS — Z01.411 ENCOUNTER FOR GYNECOLOGICAL EXAMINATION WITH ABNORMAL FINDING: Primary | ICD-10-CM

## 2024-04-18 DIAGNOSIS — Z12.4 SCREENING FOR CERVICAL CANCER: ICD-10-CM

## 2024-04-18 DIAGNOSIS — N89.8 VAGINAL DISCHARGE: ICD-10-CM

## 2024-04-18 DIAGNOSIS — Z11.51 SCREENING FOR HUMAN PAPILLOMAVIRUS (HPV): ICD-10-CM

## 2024-04-18 PROCEDURE — 1036F TOBACCO NON-USER: CPT | Performed by: OBSTETRICS & GYNECOLOGY

## 2024-04-18 PROCEDURE — 88141 CYTOPATH C/V INTERPRET: CPT | Performed by: PATHOLOGY

## 2024-04-18 PROCEDURE — 88142 CYTOPATH C/V THIN LAYER: CPT

## 2024-04-18 PROCEDURE — 99395 PREV VISIT EST AGE 18-39: CPT | Performed by: OBSTETRICS & GYNECOLOGY

## 2024-04-18 PROCEDURE — 87205 SMEAR GRAM STAIN: CPT

## 2024-04-18 PROCEDURE — 87800 DETECT AGNT MULT DNA DIREC: CPT

## 2024-04-18 PROCEDURE — 87661 TRICHOMONAS VAGINALIS AMPLIF: CPT

## 2024-04-18 PROCEDURE — 87624 HPV HI-RISK TYP POOLED RSLT: CPT

## 2024-04-19 LAB
C TRACH RRNA SPEC QL NAA+PROBE: NEGATIVE
CLUE CELLS VAG LPF-#/AREA: NORMAL /[LPF]
N GONORRHOEA DNA SPEC QL PROBE+SIG AMP: NEGATIVE
NUGENT SCORE: 1
T VAGINALIS RRNA SPEC QL NAA+PROBE: NEGATIVE
YEAST VAG WET PREP-#/AREA: NORMAL

## 2024-04-30 ENCOUNTER — TELEPHONE (OUTPATIENT)
Dept: OBSTETRICS AND GYNECOLOGY | Facility: CLINIC | Age: 29
End: 2024-04-30
Payer: COMMERCIAL

## 2024-04-30 LAB
CYTOLOGY CMNT CVX/VAG CYTO-IMP: NORMAL
HPV HR 12 DNA GENITAL QL NAA+PROBE: POSITIVE
HPV HR GENOTYPES PNL CVX NAA+PROBE: POSITIVE
HPV16 DNA SPEC QL NAA+PROBE: NEGATIVE
HPV18 DNA SPEC QL NAA+PROBE: NEGATIVE
LAB AP HPV GENOTYPE QUESTION: YES
LAB AP HPV HR: NORMAL
LAB AP PAP ADDITIONAL TESTS: NORMAL
LABORATORY COMMENT REPORT: NORMAL
LABORATORY COMMENT REPORT: NORMAL
LMP START DATE: NORMAL
PATH REPORT.TOTAL CANCER: NORMAL

## 2024-04-30 NOTE — TELEPHONE ENCOUNTER
----- Message from Marguerite Rueda MD sent at 4/30/2024  1:59 PM EDT -----  Pap with ASCUS and high risk HPV positive (-16/18), please schedule colposcopy and possible cervical biopsy  ----- Message -----  From: Lab, Background User  Sent: 4/19/2024   2:45 PM EDT  To: Marguerite Rueda MD

## 2024-05-06 NOTE — PROGRESS NOTES
Patient ID: Holli Bales is a 28 y.o. female, here for colposcopy  PAP with ASCUS and positive HR HPV     Colposcopy    Date/Time: 5/6/2024 6:16 PM    Performed by: Marguerite Rueda MD  Authorized by: Marguerite Rueda MD    Procedure location: cervix    Consent:     Patient questions answered: yes      Risks and benefits of the procedure and its alternatives discussed: yes      Procedural risks discussed:  Bleeding and infection    Consent obtained:  Written    Consent given by:  Patient  Indication:     Cervical indication(s): high-risk HPV positive and ASCUS    Pre-procedure:     Speculum was placed in the vagina: yes      Prep solution(s): acetic acid    Procedure:     Colposcopy with: cervical biopsy      Biopsy taken: yes      # of biopsies:  3    Biopsy location(s): 7, 1 and 3 o' clock    Cervix visibility: fully visualized      SCJ visibility: fully visualized      Lesion visualized: fully visualized      Acetowhite lesion(s): cervix      Vascular changes: cervix      Other lesion(s): cervix      Other lesion(s) description:  Ectropion    Cervical impression: low grade      Ferric subsulfate solution applied: yes      Tampon inserted: no    Post-procedure:     Patient tolerance of procedure:  Patient tolerated the procedure well with no immediate complications    Instructions and paperwork completed: yes      Educational handouts given: yes

## 2024-05-08 ENCOUNTER — TELEPHONE (OUTPATIENT)
Dept: OBSTETRICS AND GYNECOLOGY | Facility: CLINIC | Age: 29
End: 2024-05-08
Payer: COMMERCIAL

## 2024-05-08 NOTE — TELEPHONE ENCOUNTER
Patient returned call. Patient will wait and see if period starts. If her period starts she will call and reschedule colposcopy for after her period.

## 2024-05-08 NOTE — TELEPHONE ENCOUNTER
Patient is scheduled for a [place] tomorrow with Dr. Rueda. She woke up this morning spotting so she isnt sure if she will start her period either today or tomorrow. Would like to know if this will need rescheduled if she is on her period. Please advise.

## 2024-05-09 ENCOUNTER — PROCEDURE VISIT (OUTPATIENT)
Dept: OBSTETRICS AND GYNECOLOGY | Facility: CLINIC | Age: 29
End: 2024-05-09
Payer: COMMERCIAL

## 2024-05-09 VITALS
SYSTOLIC BLOOD PRESSURE: 90 MMHG | HEIGHT: 66 IN | WEIGHT: 137 LBS | DIASTOLIC BLOOD PRESSURE: 60 MMHG | BODY MASS INDEX: 22.02 KG/M2

## 2024-05-09 DIAGNOSIS — R87.610 ASCUS WITH POSITIVE HIGH RISK HPV CERVICAL: ICD-10-CM

## 2024-05-09 DIAGNOSIS — R87.810 ASCUS WITH POSITIVE HIGH RISK HPV CERVICAL: ICD-10-CM

## 2024-05-09 PROCEDURE — 88305 TISSUE EXAM BY PATHOLOGIST: CPT

## 2024-05-09 PROCEDURE — 88305 TISSUE EXAM BY PATHOLOGIST: CPT | Performed by: PATHOLOGY

## 2024-05-09 PROCEDURE — 57455 BIOPSY OF CERVIX W/SCOPE: CPT | Performed by: OBSTETRICS & GYNECOLOGY

## 2024-05-16 LAB
LABORATORY COMMENT REPORT: NORMAL
PATH REPORT.FINAL DX SPEC: NORMAL
PATH REPORT.GROSS SPEC: NORMAL
PATH REPORT.RELEVANT HX SPEC: NORMAL
PATH REPORT.TOTAL CANCER: NORMAL

## 2024-09-11 ENCOUNTER — OFFICE VISIT (OUTPATIENT)
Dept: OBSTETRICS AND GYNECOLOGY | Facility: CLINIC | Age: 29
End: 2024-09-11
Payer: COMMERCIAL

## 2024-09-11 VITALS
SYSTOLIC BLOOD PRESSURE: 109 MMHG | DIASTOLIC BLOOD PRESSURE: 53 MMHG | HEIGHT: 65 IN | BODY MASS INDEX: 23.66 KG/M2 | WEIGHT: 142 LBS

## 2024-09-11 DIAGNOSIS — N89.8 VAGINAL IRRITATION: ICD-10-CM

## 2024-09-11 DIAGNOSIS — R10.2 PELVIC CRAMPING: Primary | ICD-10-CM

## 2024-09-11 DIAGNOSIS — N93.9 ABNORMAL UTERINE BLEEDING: ICD-10-CM

## 2024-09-11 PROCEDURE — 99213 OFFICE O/P EST LOW 20 MIN: CPT | Performed by: ADVANCED PRACTICE MIDWIFE

## 2024-09-11 PROCEDURE — 99203 OFFICE O/P NEW LOW 30 MIN: CPT | Performed by: ADVANCED PRACTICE MIDWIFE

## 2024-09-11 PROCEDURE — 3008F BODY MASS INDEX DOCD: CPT | Performed by: ADVANCED PRACTICE MIDWIFE

## 2024-09-11 RX ORDER — FLUCONAZOLE 150 MG/1
150 TABLET ORAL
Qty: 2 TABLET | Refills: 0 | Status: SHIPPED | OUTPATIENT
Start: 2024-09-11 | End: 2024-09-15

## 2024-09-11 ASSESSMENT — ENCOUNTER SYMPTOMS
EYES NEGATIVE: 0
ACTIVITY CHANGE: 0
CARDIOVASCULAR NEGATIVE: 0
FLANK PAIN: 0
NAUSEA: 0
ALLERGIC/IMMUNOLOGIC NEGATIVE: 1
PSYCHIATRIC NEGATIVE: 0
POLYPHAGIA: 0
DIARRHEA: 0
HEMATOLOGIC/LYMPHATIC NEGATIVE: 0
CHILLS: 0
NEUROLOGICAL NEGATIVE: 0
GASTROINTESTINAL NEGATIVE: 0
CONSTIPATION: 0
POLYDIPSIA: 0
ALLERGIC/IMMUNOLOGIC NEGATIVE: 0
ENDOCRINE NEGATIVE: 0
RECTAL PAIN: 0
ABDOMINAL PAIN: 0
HEMATURIA: 0
PSYCHIATRIC NEGATIVE: 1
ABDOMINAL DISTENTION: 0
RESPIRATORY NEGATIVE: 0
VOMITING: 0
ANAL BLEEDING: 0
NEUROLOGICAL NEGATIVE: 1
CONSTITUTIONAL NEGATIVE: 0
BLOOD IN STOOL: 0
HEMATOLOGIC/LYMPHATIC NEGATIVE: 1
FATIGUE: 0
FEVER: 0
DIFFICULTY URINATING: 0
MUSCULOSKELETAL NEGATIVE: 0
FREQUENCY: 0
MUSCULOSKELETAL NEGATIVE: 1
DYSURIA: 0

## 2024-09-11 ASSESSMENT — PAIN SCALES - GENERAL: PAINLEVEL: 0-NO PAIN

## 2024-09-11 NOTE — PROGRESS NOTES
Subjective   Patient ID: Holli Bales is a 28 y.o. female who presents for Vaginal Bleeding (Bleeding/swollen in the vagina area).  Reports abnormal uterine bleeding that began 1 week after her normal menstrual cycle had stopped. She reports a normal menstrual cycle every 28 days with bleeding lasting 5-7 days and without menorrhagia. She is not on birth control at this time as is not sexually active and has not been for 1 full year. She reports the bleeding is scant and in the form of spotting a dark red blood. Does reports some mild vaginal irritation and some pelvic pain and cramping with onset of bleeding. Denies other abnormal vaginal discharge or urinary symptoms.         Review of Systems   Constitutional:  Negative for activity change, chills, fatigue and fever.   Gastrointestinal:  Negative for abdominal distention, abdominal pain, anal bleeding, blood in stool, constipation, diarrhea, nausea, rectal pain and vomiting.   Endocrine: Negative for cold intolerance, heat intolerance, polydipsia, polyphagia and polyuria.   Genitourinary:  Positive for menstrual problem, pelvic pain and vaginal bleeding. Negative for decreased urine volume, difficulty urinating, dyspareunia, dysuria, enuresis, flank pain, frequency, genital sores, hematuria, urgency, vaginal discharge and vaginal pain.   Musculoskeletal: Negative.    Allergic/Immunologic: Negative.    Neurological: Negative.    Hematological: Negative.    Psychiatric/Behavioral: Negative.         Objective   Physical Exam  Constitutional:       Appearance: Normal appearance.   HENT:      Head: Normocephalic.   Cardiovascular:      Rate and Rhythm: Normal rate.   Pulmonary:      Effort: Pulmonary effort is normal.      Breath sounds: Normal breath sounds.   Abdominal:      General: Abdomen is flat.      Palpations: Abdomen is soft.      Hernia: There is no hernia in the left inguinal area or right inguinal area.   Genitourinary:     Labia:         Right: No rash,  tenderness, lesion or injury.         Left: No rash, tenderness, lesion or injury.       Vagina: No signs of injury and foreign body. No vaginal discharge, erythema, tenderness, bleeding, lesions or prolapsed vaginal walls.      Cervix: Cervical motion tenderness, friability, erythema and cervical bleeding present. No discharge or lesion.      Uterus: Normal. Not deviated, not enlarged, not fixed and not tender.       Adnexa:         Right: Tenderness present. No mass or fullness.          Left: Tenderness present. No mass or fullness.        Rectum: Normal.   Musculoskeletal:         General: Normal range of motion.   Lymphadenopathy:      Lower Body: No right inguinal adenopathy. No left inguinal adenopathy.   Skin:     General: Skin is warm and dry.   Neurological:      General: No focal deficit present.      Mental Status: She is alert and oriented to person, place, and time. Mental status is at baseline.   Psychiatric:         Mood and Affect: Mood normal.         Behavior: Behavior normal.         Thought Content: Thought content normal.         Judgment: Judgment normal.     Wet mount pos for hyphal yeast: script sent.     Assessment/Plan   Diagnoses and all orders for this visit:  Pelvic cramping  -     US PELVIS TRANSABDOMINAL WITH TRANSVAGINAL; Future  Abnormal uterine bleeding  Vaginal irritation  -     fluconazole (Diflucan) 150 mg tablet; Take 1 tablet (150 mg) by mouth every 3rd day for 2 doses.           TULIO Whitaker 09/11/24 4:37 PM

## 2024-09-13 ENCOUNTER — APPOINTMENT (OUTPATIENT)
Dept: RADIOLOGY | Facility: CLINIC | Age: 29
End: 2024-09-13
Payer: COMMERCIAL

## 2024-09-17 ENCOUNTER — HOSPITAL ENCOUNTER (OUTPATIENT)
Dept: RADIOLOGY | Facility: CLINIC | Age: 29
Discharge: HOME | End: 2024-09-17
Payer: COMMERCIAL

## 2024-09-17 DIAGNOSIS — R10.2 PELVIC CRAMPING: ICD-10-CM

## 2024-09-17 PROCEDURE — 76856 US EXAM PELVIC COMPLETE: CPT | Performed by: RADIOLOGY

## 2024-09-17 PROCEDURE — 76830 TRANSVAGINAL US NON-OB: CPT | Performed by: RADIOLOGY

## 2024-09-17 PROCEDURE — 76856 US EXAM PELVIC COMPLETE: CPT

## 2024-09-24 ENCOUNTER — TELEPHONE (OUTPATIENT)
Dept: OBSTETRICS AND GYNECOLOGY | Facility: CLINIC | Age: 29
End: 2024-09-24
Payer: COMMERCIAL

## 2024-09-24 NOTE — TELEPHONE ENCOUNTER
RN returned Patient call, verified by name and   Patient stated that she finished the course of antibiotics and is still having symptoms  RN advised she would need to come in for an exam since symptoms persisted after antibiotics  Next day appointment scheduled  Patient verbalizes understanding  Trupti Martinez RN

## 2024-09-24 NOTE — TELEPHONE ENCOUNTER
Patient was seen on September 11th and the medication is not working she wants to know if she need another medication or should she come in for another visit. Please call to discuss.

## 2024-09-25 ENCOUNTER — OFFICE VISIT (OUTPATIENT)
Dept: OBSTETRICS AND GYNECOLOGY | Facility: CLINIC | Age: 29
End: 2024-09-25
Payer: COMMERCIAL

## 2024-09-25 VITALS
WEIGHT: 143 LBS | BODY MASS INDEX: 23.82 KG/M2 | DIASTOLIC BLOOD PRESSURE: 67 MMHG | HEIGHT: 65 IN | SYSTOLIC BLOOD PRESSURE: 108 MMHG

## 2024-09-25 DIAGNOSIS — N89.8 VAGINAL IRRITATION: Primary | ICD-10-CM

## 2024-09-25 PROCEDURE — 87205 SMEAR GRAM STAIN: CPT | Performed by: ADVANCED PRACTICE MIDWIFE

## 2024-09-25 PROCEDURE — 99213 OFFICE O/P EST LOW 20 MIN: CPT | Performed by: ADVANCED PRACTICE MIDWIFE

## 2024-09-25 PROCEDURE — 3008F BODY MASS INDEX DOCD: CPT | Performed by: ADVANCED PRACTICE MIDWIFE

## 2024-09-25 ASSESSMENT — ENCOUNTER SYMPTOMS
DYSURIA: 0
UNEXPECTED WEIGHT CHANGE: 0
RESPIRATORY NEGATIVE: 1
HEMATOLOGIC/LYMPHATIC NEGATIVE: 0
ACTIVITY CHANGE: 0
DIFFICULTY URINATING: 0
FREQUENCY: 0
CONSTITUTIONAL NEGATIVE: 0
ENDOCRINE NEGATIVE: 0
CHILLS: 0
EYES NEGATIVE: 0
FEVER: 0
CARDIOVASCULAR NEGATIVE: 0
GASTROINTESTINAL NEGATIVE: 0
FLANK PAIN: 0
RESPIRATORY NEGATIVE: 0
FATIGUE: 0
ALLERGIC/IMMUNOLOGIC NEGATIVE: 0
MUSCULOSKELETAL NEGATIVE: 1
CARDIOVASCULAR NEGATIVE: 1
HEMATURIA: 0
NEUROLOGICAL NEGATIVE: 0
MUSCULOSKELETAL NEGATIVE: 0
PSYCHIATRIC NEGATIVE: 1
PSYCHIATRIC NEGATIVE: 0
HEMATOLOGIC/LYMPHATIC NEGATIVE: 1

## 2024-09-25 ASSESSMENT — PAIN SCALES - GENERAL: PAINLEVEL: 0-NO PAIN

## 2024-09-25 NOTE — PROGRESS NOTES
Subjective   Patient ID: Holli Bales is a 28 y.o. female who presents for Vaginal Discharge (Vaginal discharge/itch).  Reports after treatment for yeast infection she initially noticed a decrease in symptoms of vaginal irritation. Shortly afterwards the symptoms came back. She is here with C/O vaginal itching and irritation. No new sexual partners or soaps/lotions/detergents. Denies vaginal bleeding today. Denies urinary symptoms or pelvic pain.         Review of Systems   Constitutional:  Negative for activity change, chills, fatigue, fever and unexpected weight change.   Respiratory: Negative.     Cardiovascular: Negative.    Genitourinary:  Positive for vaginal discharge and vaginal pain. Negative for decreased urine volume, difficulty urinating, dyspareunia, dysuria, enuresis, flank pain, frequency, genital sores, hematuria, menstrual problem, pelvic pain, urgency and vaginal bleeding.   Musculoskeletal: Negative.    Skin: Negative.    Hematological: Negative.    Psychiatric/Behavioral: Negative.         Objective   Physical Exam  Constitutional:       Appearance: Normal appearance.   Cardiovascular:      Rate and Rhythm: Normal rate and regular rhythm.   Pulmonary:      Effort: Pulmonary effort is normal.   Abdominal:      General: Abdomen is flat.      Palpations: Abdomen is soft.   Genitourinary:     Labia:         Right: No rash, tenderness, lesion or injury.         Left: No rash, tenderness, lesion or injury.       Vagina: No signs of injury and foreign body. Vaginal discharge present. No erythema, tenderness, bleeding, lesions or prolapsed vaginal walls.      Cervix: No cervical motion tenderness, discharge, friability, lesion, erythema or cervical bleeding.      Uterus: Normal.       Adnexa: Right adnexa normal and left adnexa normal.   Musculoskeletal:         General: Normal range of motion.      Cervical back: Normal range of motion.   Skin:     General: Skin is warm and dry.   Neurological:       General: No focal deficit present.      Mental Status: She is alert and oriented to person, place, and time. Mental status is at baseline.   Psychiatric:         Mood and Affect: Mood normal.         Behavior: Behavior normal.         Thought Content: Thought content normal.         Judgment: Judgment normal.         Assessment/Plan   Diagnoses and all orders for this visit:  Vaginal irritation  -     Vaginitis Gram Stain For Bacterial Vaginosis + Yeast           AMEENA Whitaker-EDM 09/25/24 11:32 AM

## 2024-09-27 DIAGNOSIS — R09.81 NASAL SINUS CONGESTION: ICD-10-CM

## 2024-09-27 RX ORDER — FLUTICASONE PROPIONATE 50 MCG
1 SPRAY, SUSPENSION (ML) NASAL DAILY
Qty: 16 G | Refills: 0 | Status: SHIPPED | OUTPATIENT
Start: 2024-09-27

## 2024-09-28 LAB
CLUE CELLS VAG LPF-#/AREA: NORMAL /[LPF]
NUGENT SCORE: 0
YEAST VAG WET PREP-#/AREA: NORMAL

## 2024-10-10 ENCOUNTER — LAB (OUTPATIENT)
Dept: LAB | Facility: LAB | Age: 29
End: 2024-10-10
Payer: COMMERCIAL

## 2024-10-10 ENCOUNTER — APPOINTMENT (OUTPATIENT)
Dept: PRIMARY CARE | Facility: CLINIC | Age: 29
End: 2024-10-10
Payer: COMMERCIAL

## 2024-10-10 VITALS
OXYGEN SATURATION: 100 % | HEART RATE: 98 BPM | SYSTOLIC BLOOD PRESSURE: 102 MMHG | BODY MASS INDEX: 23.66 KG/M2 | WEIGHT: 142 LBS | DIASTOLIC BLOOD PRESSURE: 70 MMHG | HEIGHT: 65 IN

## 2024-10-10 DIAGNOSIS — J32.0 CHRONIC MAXILLARY SINUSITIS: ICD-10-CM

## 2024-10-10 DIAGNOSIS — R09.81 NASAL SINUS CONGESTION: ICD-10-CM

## 2024-10-10 DIAGNOSIS — Z00.00 HEALTHCARE MAINTENANCE: ICD-10-CM

## 2024-10-10 DIAGNOSIS — G43.109 MIGRAINE WITH AURA AND WITHOUT STATUS MIGRAINOSUS, NOT INTRACTABLE: ICD-10-CM

## 2024-10-10 DIAGNOSIS — J06.9 ACUTE URI: ICD-10-CM

## 2024-10-10 DIAGNOSIS — F33.1 MAJOR DEPRESSIVE DISORDER, RECURRENT, MODERATE: ICD-10-CM

## 2024-10-10 DIAGNOSIS — Z00.00 HEALTHCARE MAINTENANCE: Primary | ICD-10-CM

## 2024-10-10 LAB
ALBUMIN SERPL BCP-MCNC: 4.6 G/DL (ref 3.4–5)
ALP SERPL-CCNC: 71 U/L (ref 33–110)
ALT SERPL W P-5'-P-CCNC: 8 U/L (ref 7–45)
ANION GAP SERPL CALC-SCNC: 11 MMOL/L (ref 10–20)
AST SERPL W P-5'-P-CCNC: 16 U/L (ref 9–39)
BASOPHILS # BLD AUTO: 0.07 X10*3/UL (ref 0–0.1)
BASOPHILS NFR BLD AUTO: 1.5 %
BILIRUB SERPL-MCNC: 0.4 MG/DL (ref 0–1.2)
BUN SERPL-MCNC: 12 MG/DL (ref 6–23)
CALCIUM SERPL-MCNC: 9.5 MG/DL (ref 8.6–10.3)
CHLORIDE SERPL-SCNC: 107 MMOL/L (ref 98–107)
CHOLEST SERPL-MCNC: 161 MG/DL (ref 0–199)
CHOLESTEROL/HDL RATIO: 2.5
CO2 SERPL-SCNC: 25 MMOL/L (ref 21–32)
CREAT SERPL-MCNC: 0.87 MG/DL (ref 0.5–1.05)
EGFRCR SERPLBLD CKD-EPI 2021: >90 ML/MIN/1.73M*2
EOSINOPHIL # BLD AUTO: 0.07 X10*3/UL (ref 0–0.7)
EOSINOPHIL NFR BLD AUTO: 1.5 %
ERYTHROCYTE [DISTWIDTH] IN BLOOD BY AUTOMATED COUNT: 17.8 % (ref 11.5–14.5)
EST. AVERAGE GLUCOSE BLD GHB EST-MCNC: 111 MG/DL
GLUCOSE SERPL-MCNC: 83 MG/DL (ref 74–99)
HBA1C MFR BLD: 5.5 %
HCT VFR BLD AUTO: 39 % (ref 36–46)
HCV AB SER QL: NONREACTIVE
HDLC SERPL-MCNC: 64.6 MG/DL
HGB BLD-MCNC: 11.6 G/DL (ref 12–16)
HIV 1+2 AB+HIV1 P24 AG SERPL QL IA: NONREACTIVE
IMM GRANULOCYTES # BLD AUTO: 0.01 X10*3/UL (ref 0–0.7)
IMM GRANULOCYTES NFR BLD AUTO: 0.2 % (ref 0–0.9)
LDLC SERPL CALC-MCNC: 79 MG/DL
LYMPHOCYTES # BLD AUTO: 1.56 X10*3/UL (ref 1.2–4.8)
LYMPHOCYTES NFR BLD AUTO: 33.6 %
MCH RBC QN AUTO: 22.3 PG (ref 26–34)
MCHC RBC AUTO-ENTMCNC: 29.7 G/DL (ref 32–36)
MCV RBC AUTO: 75 FL (ref 80–100)
MONOCYTES # BLD AUTO: 0.26 X10*3/UL (ref 0.1–1)
MONOCYTES NFR BLD AUTO: 5.6 %
NEUTROPHILS # BLD AUTO: 2.67 X10*3/UL (ref 1.2–7.7)
NEUTROPHILS NFR BLD AUTO: 57.6 %
NON HDL CHOLESTEROL: 96 MG/DL (ref 0–149)
NRBC BLD-RTO: 0 /100 WBCS (ref 0–0)
PLATELET # BLD AUTO: 387 X10*3/UL (ref 150–450)
POTASSIUM SERPL-SCNC: 3.9 MMOL/L (ref 3.5–5.3)
PROT SERPL-MCNC: 7.4 G/DL (ref 6.4–8.2)
RBC # BLD AUTO: 5.21 X10*6/UL (ref 4–5.2)
SODIUM SERPL-SCNC: 139 MMOL/L (ref 136–145)
TRIGL SERPL-MCNC: 87 MG/DL (ref 0–149)
TSH SERPL-ACNC: 1.48 MIU/L (ref 0.44–3.98)
VLDL: 17 MG/DL (ref 0–40)
WBC # BLD AUTO: 4.6 X10*3/UL (ref 4.4–11.3)

## 2024-10-10 PROCEDURE — 87389 HIV-1 AG W/HIV-1&-2 AB AG IA: CPT

## 2024-10-10 PROCEDURE — 99395 PREV VISIT EST AGE 18-39: CPT | Performed by: STUDENT IN AN ORGANIZED HEALTH CARE EDUCATION/TRAINING PROGRAM

## 2024-10-10 PROCEDURE — 3008F BODY MASS INDEX DOCD: CPT | Performed by: STUDENT IN AN ORGANIZED HEALTH CARE EDUCATION/TRAINING PROGRAM

## 2024-10-10 PROCEDURE — 99213 OFFICE O/P EST LOW 20 MIN: CPT | Performed by: STUDENT IN AN ORGANIZED HEALTH CARE EDUCATION/TRAINING PROGRAM

## 2024-10-10 PROCEDURE — 80053 COMPREHEN METABOLIC PANEL: CPT

## 2024-10-10 PROCEDURE — 80061 LIPID PANEL: CPT

## 2024-10-10 PROCEDURE — 85025 COMPLETE CBC W/AUTO DIFF WBC: CPT

## 2024-10-10 PROCEDURE — 86803 HEPATITIS C AB TEST: CPT

## 2024-10-10 PROCEDURE — 36415 COLL VENOUS BLD VENIPUNCTURE: CPT

## 2024-10-10 PROCEDURE — 83036 HEMOGLOBIN GLYCOSYLATED A1C: CPT

## 2024-10-10 PROCEDURE — 84443 ASSAY THYROID STIM HORMONE: CPT

## 2024-10-10 PROCEDURE — 1036F TOBACCO NON-USER: CPT | Performed by: STUDENT IN AN ORGANIZED HEALTH CARE EDUCATION/TRAINING PROGRAM

## 2024-10-10 RX ORDER — TOPIRAMATE 50 MG/1
50 TABLET, FILM COATED ORAL 2 TIMES DAILY
Qty: 180 TABLET | Refills: 1 | Status: SHIPPED | OUTPATIENT
Start: 2024-10-10

## 2024-10-10 RX ORDER — AZELASTINE 1 MG/ML
1 SPRAY, METERED NASAL 2 TIMES DAILY
Qty: 30 ML | Refills: 3 | Status: SHIPPED | OUTPATIENT
Start: 2024-10-10 | End: 2024-11-09

## 2024-10-10 RX ORDER — MONTELUKAST SODIUM 10 MG/1
10 TABLET ORAL DAILY
Qty: 90 TABLET | Refills: 3 | Status: SHIPPED | OUTPATIENT
Start: 2024-10-10

## 2024-10-10 RX ORDER — FLUTICASONE PROPIONATE 50 MCG
1 SPRAY, SUSPENSION (ML) NASAL DAILY
Qty: 16 G | Refills: 5 | Status: SHIPPED | OUTPATIENT
Start: 2024-10-10

## 2024-10-10 NOTE — PROGRESS NOTES
Subjective   Patient ID: Holli Bales is a 29 y.o. female who presents for Annual Exam, Depression, and Migraine.  Today she is accompanied by alone.     HPI  1.  Health maintenance  Overall patient is doing well.   Immunization: Tdap 2022  COVID-19 vaccine (Pfizer Moderna) x3  Colon Cancer Screening: No family history  OB/GYN: Continues to follow-up with a provider and is up-to-date with her Pap smears  Diet: Working on diet  Exercise: Attempting to exercise more frequently via walking  Tobacco: Denies use  EtOH: Rarely/socially    2.  Depression  Continues to follow-up with psychiatry  Continues to take Wellbutrin 300 mg as well as Prozac 40 mg daily as indicated the chart  Denies any HI/SI    3.  Migraine  Continues Topamax/topiramate 50 mg twice daily  Feels overall well but unfortunately has been having worsening headaches now a few times weekly  She did state that she had COVID-19 within the past couple of months and now wondering if this could be related or not  Wishes to discuss this further at today's visit but is reluctant due to being so stable on the topiramate    4.  Ovarian cyst  Continues to follow-up with OB/GYN and continues to have lower right quadrant discomfort  She did have a ultrasound performed that showed a possible cyst  She states that she will be following up with her OB/GYN in the near future    Current Outpatient Medications on File Prior to Visit   Medication Sig Dispense Refill    azelastine (Astelin) 137 mcg (0.1 %) nasal spray Administer 1 spray into each nostril in the morning and 1 spray before bedtime. 12 mL 0    buPROPion XL (Wellbutrin XL) 300 mg 24 hr tablet Take 1 tablet (300 mg) by mouth once daily in the morning. Take before meals.      cetirizine (ZyrTEC) 10 mg tablet Take 1 tablet (10 mg) by mouth once daily at bedtime.      FLUoxetine (PROzac) 40 mg capsule Take 1 capsule (40 mg) by mouth once daily.      fluticasone (Flonase) 50 mcg/actuation nasal spray Administer 1  "spray into each nostril once daily. Shake gently. Before first use, prime pump. After use, clean tip and replace cap. 16 g 0    folic acid (Folvite) 1 mg tablet Take Two (2) tablets daily      montelukast (Singulair) 10 mg tablet TAKE 1 TABLET BY MOUTH EVERY DAY 90 tablet 3    topiramate (Topamax) 50 mg tablet Take 1 tablet (50 mg) by mouth 2 times a day. 180 tablet 1    traZODone (Desyrel) 300 mg tablet traZODone HCl TABS   Refills: 0       Active       No current facility-administered medications on file prior to visit.        Allergies   Allergen Reactions    Turmeric GI Upset, Headache, Hives, Itching, Rash, Shortness of breath and Swelling    Lactase Diarrhea and Hives    Corn Headache, Hives, Itching, Rash and Swelling    Lactose Unknown       Immunization History   Administered Date(s) Administered    HPV, Quadrivalent 11/11/2011, 12/14/2011, 05/30/2012    Influenza, seasonal, injectable 09/23/2020    Meningococcal, Unknown Serogroups 08/10/2016    Moderna SARS-CoV-2 Vaccination 04/02/2021, 05/03/2021, 12/15/2021    Tdap vaccine, age 7 year and older (BOOSTRIX, ADACEL) 06/13/2022    Varicella vaccine, subcutaneous (VARIVAX) 09/21/2010         Review of Systems  All pertinent positive symptoms are included in the history of present illness.  All other systems have been reviewed and are negative and noncontributory to this patient's current ailments.     Objective   /70 (BP Location: Left arm, Patient Position: Sitting, BP Cuff Size: Adult)   Pulse 98   Ht 1.651 m (5' 5\")   Wt 64.4 kg (142 lb)   LMP 09/01/2024   SpO2 100%   BMI 23.63 kg/m²   BSA: 1.72 meters squared      Physical Exam  CONSTITUTIONAL - well nourished, well developed, looks like stated age, in no acute distress, not ill-appearing, and not tired appearing  SKIN - normal skin color and pigmentation, normal skin turgor without rash, lesions, or nodules visualized  HEAD - no trauma, normocephalic  EYES - normal external exam  ENT - TM's " intact, no injection, no signs of infection, uvula midline, normal tongue movement and throat normal, no exudate, nasal passage without discharge and patent  NECK - supple without rigidity, no neck mass was observed, no thyromegaly or thyroid nodules  CHEST - clear to auscultation, no wheezing, no crackles and no rales, good effort  CARDIAC - regular rate and regular rhythm, no skipped beats, no murmur  ABDOMEN - no organomegaly, soft, noted some slight tenderness in the lower right quadrant near uterus/ovaries, nondistended, normal bowel sounds, no guarding/rebound/rigidity, negative McBurney sign and negative Sherman sign  EXTREMITIES - no edema, no deformities  NEUROLOGICAL - normal gait, normal balance, normal motor, no ataxia  PSYCHIATRIC - alert, pleasant and cordial, age-appropriate  IMMUNOLOGIC - no cervical lymphadenopathy     Assessment/Plan   1.  Health maintenance  Complete history and physical examination was performed  EKG was not performed  We will notify of test results once available and make treatment recommendations accordingly  Please continue following up with your OB/GYN for her well woman visits  Also please attempt to eat a well-balanced diet exercise regularly  Lab work ordered as indicated the chart    2.  Depression  Continue following up with your psychiatrist  Stable without any changes recommended  Continue Wellbutrin 300 mg alongside Prozac 40 mg    3.  Migraine  Continue topiramate 50 mg twice daily  I would recommend that you discuss with your psychiatry provider to maybe switch your Prozac to venlafaxine as this can also help with migraine prophylaxis  Otherwise, I did recommend that you look up Ubrelvy and Nurtec to see if these are getting to be covered as this could be an option for an as needed medication  If this continues, we will have you follow-up with neurology    4.  Ovarian cyst  I would recommend that you follow-up with your OB/GYN for your well woman visits as well as  this ongoing issue    5.  Nasal congestion  I did refill your montelukast as you are doing very well with this alongside your nasal sprays    Otherwise, please follow-up in 6 months for continued care

## 2024-10-11 NOTE — RESULT ENCOUNTER NOTE
Complete blood cell count does show a microcytic anemia  If she noticing signs/symptoms of being slightly fatigued?    And often taking iron supplementation at this time    HIV and hepatitis C are negative    Thyroid within normal limits    Cholesterol looks good at 161, HDL 64, LDL 79, triglycerides 87    Sugar, kidneys, liver, electrolytes are all within normal limits    Hemoglobin A1c within normal limits

## 2025-04-11 ENCOUNTER — OFFICE VISIT (OUTPATIENT)
Dept: URGENT CARE | Age: 30
End: 2025-04-11
Payer: COMMERCIAL

## 2025-04-11 VITALS
BODY MASS INDEX: 23.63 KG/M2 | WEIGHT: 142 LBS | TEMPERATURE: 98.1 F | OXYGEN SATURATION: 99 % | SYSTOLIC BLOOD PRESSURE: 116 MMHG | HEART RATE: 56 BPM | DIASTOLIC BLOOD PRESSURE: 75 MMHG

## 2025-04-11 DIAGNOSIS — R82.90 ABNORMAL FINDING ON URINALYSIS: ICD-10-CM

## 2025-04-11 DIAGNOSIS — R31.9 HEMATURIA, UNSPECIFIED TYPE: ICD-10-CM

## 2025-04-11 DIAGNOSIS — N89.8 VAGINAL DISCHARGE: Primary | ICD-10-CM

## 2025-04-11 LAB
POC APPEARANCE, URINE: CLEAR
POC BILIRUBIN, URINE: NEGATIVE
POC BLOOD, URINE: ABNORMAL
POC COLOR, URINE: ABNORMAL
POC GLUCOSE, URINE: NEGATIVE MG/DL
POC KETONES, URINE: NEGATIVE MG/DL
POC LEUKOCYTES, URINE: ABNORMAL
POC NITRITE,URINE: NEGATIVE
POC PH, URINE: 6 PH
POC PROTEIN, URINE: ABNORMAL MG/DL
POC SPECIFIC GRAVITY, URINE: >=1.03
POC UROBILINOGEN, URINE: 0.2 EU/DL
PREGNANCY TEST URINE, POC: NEGATIVE

## 2025-04-11 RX ORDER — CEPHALEXIN 500 MG/1
500 CAPSULE ORAL 2 TIMES DAILY
Qty: 30 CAPSULE | Refills: 0 | Status: SHIPPED | OUTPATIENT
Start: 2025-04-11 | End: 2025-04-18

## 2025-04-11 RX ORDER — FLUCONAZOLE 150 MG/1
150 TABLET ORAL ONCE
Qty: 1 TABLET | Refills: 0 | Status: SHIPPED | OUTPATIENT
Start: 2025-04-11 | End: 2025-04-11

## 2025-04-11 ASSESSMENT — ENCOUNTER SYMPTOMS
LOSS OF SENSATION IN FEET: 0
OCCASIONAL FEELINGS OF UNSTEADINESS: 0
DEPRESSION: 0

## 2025-04-11 ASSESSMENT — PATIENT HEALTH QUESTIONNAIRE - PHQ9
SUM OF ALL RESPONSES TO PHQ9 QUESTIONS 1 AND 2: 0
1. LITTLE INTEREST OR PLEASURE IN DOING THINGS: NOT AT ALL
2. FEELING DOWN, DEPRESSED OR HOPELESS: NOT AT ALL

## 2025-04-11 NOTE — PROGRESS NOTES
Subjective   Patient ID: Holli Bales is a 29 y.o. female. They present today with a chief complaint of cottage cheese like vaginal discharge, tinged with bleeding and abdominal cramps x 3 days.       Past Medical History  Allergies as of 04/11/2025 - Reviewed 04/11/2025   Allergen Reaction Noted    Turmeric GI Upset, Headache, Hives, Itching, Rash, Shortness of breath, and Swelling 04/18/2024    Lactase Diarrhea and Hives 09/11/2020    Corn Headache, Hives, Itching, Rash, and Swelling 04/18/2024    Lactose Unknown 04/18/2024       (Not in a hospital admission)       Past Medical History:   Diagnosis Date    Personal history of diseases of the skin and subcutaneous tissue 06/23/2016    History of urticaria    Personal history of other mental and behavioral disorders     History of depression    Personal history of other mental and behavioral disorders 06/23/2016    History of anxiety disorder    Personal history of other specified conditions     History of shortness of breath    Personal history of other specified conditions 06/23/2016    History of headache       Past Surgical History:   Procedure Laterality Date    ADENOIDECTOMY  06/03/2015    Adenoidectomy    COLPOSCOPY      MOUTH SURGERY  06/03/2016    Oral Surgery Tooth Extraction    TONSILLECTOMY      TYMPANOSTOMY TUBE PLACEMENT  06/03/2016    Ear Pressure Equalization Tube        reports that she has never smoked. She has never used smokeless tobacco. She reports current alcohol use. She reports that she does not use drugs.    Review of Systems  Review of Systems                               Objective    Vitals:    04/11/25 1713   BP: 116/75   Pulse: 56   Temp: 36.7 °C (98.1 °F)   SpO2: 99%   Weight: 64.4 kg (142 lb)     Patient's last menstrual period was 03/28/2025 (approximate).    Physical Exam  Vitals reviewed.   Constitutional:       General: She is not in acute distress.  HENT:      Head: Normocephalic and atraumatic.      Nose: Nose normal.       Mouth/Throat:      Mouth: Mucous membranes are moist.   Eyes:      Extraocular Movements: Extraocular movements intact.      Conjunctiva/sclera: Conjunctivae normal.   Cardiovascular:      Rate and Rhythm: Normal rate and regular rhythm.      Heart sounds: No murmur heard.  Pulmonary:      Effort: Pulmonary effort is normal.      Breath sounds: Normal breath sounds. No decreased breath sounds, wheezing, rhonchi or rales.   Abdominal:      General: Bowel sounds are normal.      Palpations: Abdomen is soft.      Tenderness: There is no abdominal tenderness. There is no right CVA tenderness or left CVA tenderness.   Skin:     General: Skin is warm.   Neurological:      Mental Status: She is alert and oriented to person, place, and time.   Psychiatric:         Mood and Affect: Mood normal.         Behavior: Behavior normal.             Point of Care Test & Imaging Results from this visit  Results for orders placed or performed in visit on 04/11/25   POCT pregnancy, urine manually resulted   Result Value Ref Range    Preg Test, Ur Negative Negative   POCT UA Automated manually resulted   Result Value Ref Range    POC Color, Urine Red (A) Straw, Yellow, Light-Yellow    POC Appearance, Urine Clear Clear    POC Glucose, Urine NEGATIVE NEGATIVE mg/dl    POC Bilirubin, Urine NEGATIVE NEGATIVE    POC Ketones, Urine NEGATIVE NEGATIVE mg/dl    POC Specific Gravity, Urine >=1.030 1.005 - 1.035    POC Blood, Urine LARGE (3+) (A) NEGATIVE    POC PH, Urine 6.0 No Reference Range Established PH    POC Protein, Urine 30 (1+) (A) NEGATIVE mg/dl    POC Urobilinogen, Urine 0.2 0.2, 1.0 EU/DL    Poc Nitrite, Urine NEGATIVE NEGATIVE    POC Leukocytes, Urine SMALL (1+) (A) NEGATIVE      Imaging  No results found.    Cardiology, Vascular, and Other Imaging  No other imaging results found for the past 2 days      Diagnostic study results (if any) were reviewed by Brady Ashraf PA-C.    Assessment/Plan   Allergies, medications, history, and  pertinent labs/EKGs/Imaging reviewed by Brady Ashraf PA-C.     Medical Decision Making  UA is pos for 1+ leuks,1+ protein, 3+ blood. Neg urine hCG. Urine cx sent out. Swabs for BV, yeast, chlamydia, gonorrhea, and trichomonas sent out.  -         Patient is educated about their diagnoses.     -          Discussed medications benefits and adverse effects.     -          Answered all patient’s questions.     -          Patient will call 911 or go to the nearest ED if worsen symptoms .     -          Patient is agreeable to the plan of care and is deemed stable upon discharge.     -          Follow up with your primary care provider in two days.      Orders and Diagnoses  Diagnoses and all orders for this visit:  Vaginal discharge  -     POCT pregnancy, urine manually resulted  -     POCT UA Automated manually resulted  -     C. trachomatis / N. gonorrhoeae, Amplified, Urogenital; Future  -     Trichomonas vaginalis, Amplified  -     Vaginitis Gram Stain For Bacterial Vaginosis + Yeast  -     Urine Culture  -     fluconazole (Diflucan) 150 mg tablet; Take 1 tablet (150 mg) by mouth 1 time for 1 dose.  Hematuria, unspecified type  -     Urine Culture  -     cephalexin (Keflex) 500 mg capsule; Take 1 capsule (500 mg) by mouth 2 times a day for 7 days. Discard remainder  Abnormal finding on urinalysis  -     Urine Culture  -     cephalexin (Keflex) 500 mg capsule; Take 1 capsule (500 mg) by mouth 2 times a day for 7 days. Discard remainder      Medical Admin Record      Patient disposition: Home    Electronically signed by Brady Ashraf PA-C  5:44 PM

## 2025-04-13 LAB
BACTERIA UR CULT: ABNORMAL
BV SCORE VAG QL: NORMAL
T VAGINALIS RRNA SPEC QL NAA+PROBE: NOT DETECTED

## 2025-05-02 ENCOUNTER — APPOINTMENT (OUTPATIENT)
Dept: OBSTETRICS AND GYNECOLOGY | Facility: CLINIC | Age: 30
End: 2025-05-02
Payer: COMMERCIAL

## 2025-05-02 VITALS
SYSTOLIC BLOOD PRESSURE: 98 MMHG | HEIGHT: 65 IN | WEIGHT: 141 LBS | BODY MASS INDEX: 23.49 KG/M2 | DIASTOLIC BLOOD PRESSURE: 62 MMHG

## 2025-05-02 DIAGNOSIS — Z12.4 SCREENING FOR CERVICAL CANCER: ICD-10-CM

## 2025-05-02 DIAGNOSIS — N92.1 MENORRHAGIA WITH IRREGULAR CYCLE: ICD-10-CM

## 2025-05-02 DIAGNOSIS — Z30.09 ENCOUNTER FOR GENERAL COUNSELING AND ADVICE ON CONTRACEPTIVE MANAGEMENT: ICD-10-CM

## 2025-05-02 DIAGNOSIS — Z01.411 ENCOUNTER FOR GYNECOLOGICAL EXAMINATION WITH ABNORMAL FINDING: Primary | ICD-10-CM

## 2025-05-02 DIAGNOSIS — Z01.419 ENCOUNTER FOR WELL WOMAN EXAM WITH ROUTINE GYNECOLOGICAL EXAM: ICD-10-CM

## 2025-05-02 DIAGNOSIS — Z11.51 SCREENING FOR HUMAN PAPILLOMAVIRUS (HPV): ICD-10-CM

## 2025-05-02 PROCEDURE — 99395 PREV VISIT EST AGE 18-39: CPT | Performed by: OBSTETRICS & GYNECOLOGY

## 2025-05-02 PROCEDURE — 3008F BODY MASS INDEX DOCD: CPT | Performed by: OBSTETRICS & GYNECOLOGY

## 2025-05-02 PROCEDURE — 1036F TOBACCO NON-USER: CPT | Performed by: OBSTETRICS & GYNECOLOGY

## 2025-05-02 PROCEDURE — 87626 HPV SEP HI-RSK TYP&POOL RSLT: CPT

## 2025-05-02 PROCEDURE — 88142 CYTOPATH C/V THIN LAYER: CPT

## 2025-05-02 SDOH — ECONOMIC STABILITY: FOOD INSECURITY: WITHIN THE PAST 12 MONTHS, THE FOOD YOU BOUGHT JUST DIDN'T LAST AND YOU DIDN'T HAVE MONEY TO GET MORE.: NEVER TRUE

## 2025-05-02 SDOH — ECONOMIC STABILITY: FOOD INSECURITY: WITHIN THE PAST 12 MONTHS, YOU WORRIED THAT YOUR FOOD WOULD RUN OUT BEFORE YOU GOT MONEY TO BUY MORE.: NEVER TRUE

## 2025-05-02 SDOH — ECONOMIC STABILITY: TRANSPORTATION INSECURITY
IN THE PAST 12 MONTHS, HAS THE LACK OF TRANSPORTATION KEPT YOU FROM MEDICAL APPOINTMENTS OR FROM GETTING MEDICATIONS?: NO

## 2025-05-02 SDOH — ECONOMIC STABILITY: TRANSPORTATION INSECURITY
IN THE PAST 12 MONTHS, HAS LACK OF TRANSPORTATION KEPT YOU FROM MEETINGS, WORK, OR FROM GETTING THINGS NEEDED FOR DAILY LIVING?: NO

## 2025-05-02 NOTE — PROGRESS NOTES
"Holli Bales is a 29 y.o. female who is here for a routine exam. PCP = Sharla Rojastigarry,   Family Hx: breast cancer maternal grandmother and great grandmother  APE Concerns: none  March period 3/28, April 15 th started heavy bleeding, lasted for 4 days, started period again 4/28  Other Concerns:   Preventative:  Seat Belt Use: always    GynHx:  Periods are regular every 28-30 days, lasting 5 days.  Dysmenorrhea: none.   Cyclic symptoms include none.    Social History     Substance and Sexual Activity   Sexual Activity Not Currently    Partners: Male    Birth control/protection: Ring     Current contraception: None  STIs: none  Last PAP: ASCUS with positive HR HPV, with negative cervical biopsy      SoHx:  Substance:   Tobacco Use: Low Risk  (5/2/2025)    Patient History     Smoking Tobacco Use: Never     Smokeless Tobacco Use: Never     Passive Exposure: Not on file      Social History     Substance and Sexual Activity   Drug Use Never      Social History     Substance and Sexual Activity   Alcohol Use Yes    Comment: Socially       Medical History[1]   Surgical History[2]   Objective   BP 98/62   Ht 1.651 m (5' 5\")   Wt 64 kg (141 lb)   LMP 03/24/2025 (Exact Date)   BMI 23.46 kg/m²      General:   Alert and oriented, in no acute distress   Neck: Supple. No visible thyromegaly.    Breast/Axilla: Normal to palpation bilaterally without masses, skin changes, or nipple discharge.    Abdomen: Soft, non-tender, without masses or organomegaly   Vulva: Normal architecture without erythema, masses, or lesions.    Vagina: Normal mucosa without lesions, masses, or atrophy. No abnormal vaginal discharge.    Cervix: Normal without masses, lesions, or signs of cervicitis.    Uterus: Normal mobile, non-enlarged uterus    Adnexa: Normal without masses or lesions   Pelvic Floor No POP noted. No high tone pelvic floor    Psych Normal affect. Normal mood.      Problem List Items Addressed This Visit       Encounter for " gynecological examination with abnormal finding - Primary    Menorrhagia with irregular cycle    Encounter for general counseling and advice on contraceptive management     Other Visit Diagnoses         Encounter for well woman exam with routine gynecological exam        Relevant Orders    THINPREP PAP      Screening for cervical cancer        Relevant Orders    THINPREP PAP      Screening for human papillomavirus (HPV)        Relevant Orders    THINPREP PAP           Assessment/Plan    Thank you for coming to your annual exam. Your findings during the exam were normal. Specific topics addressed during this exam included: healthy lifestyle, well woman screening guidelines,  Healthy diet with high fiber, Weight bearing exercise 3 to 5 times a week, Multivitamins and calcium     Actions performed during this visit include:  - Clinical breast exam  - Clinical pelvic exam  - Desires Mirena IUD, GC/CT ordered and counseling done  - PAP and HPV ordered  F/u in 4 weeks for IUD insertion       Please return for your next visit in 1 year.         [1]   Past Medical History:  Diagnosis Date    Allergic     Anxiety     Depression     Migraine     Personal history of diseases of the skin and subcutaneous tissue 06/23/2016    History of urticaria    Personal history of other mental and behavioral disorders     History of depression    Personal history of other mental and behavioral disorders 06/23/2016    History of anxiety disorder    Personal history of other specified conditions     History of shortness of breath    Personal history of other specified conditions 06/23/2016    History of headache   [2]   Past Surgical History:  Procedure Laterality Date    ADENOIDECTOMY  06/03/2015    Adenoidectomy    COLPOSCOPY      MOUTH SURGERY  06/03/2016    Oral Surgery Tooth Extraction    TONSILLECTOMY      TYMPANOSTOMY TUBE PLACEMENT  06/03/2016    Ear Pressure Equalization Tube    WISDOM TOOTH EXTRACTION

## 2025-05-14 LAB
CYTOLOGY CMNT CVX/VAG CYTO-IMP: NORMAL
HPV HR 12 DNA GENITAL QL NAA+PROBE: NEGATIVE
HPV HR GENOTYPES PNL CVX NAA+PROBE: NEGATIVE
HPV16 DNA SPEC QL NAA+PROBE: NEGATIVE
HPV18 DNA SPEC QL NAA+PROBE: NEGATIVE
LAB AP HPV GENOTYPE QUESTION: YES
LAB AP HPV HR: NORMAL
LABORATORY COMMENT REPORT: NORMAL
LABORATORY COMMENT REPORT: NORMAL
PATH REPORT.TOTAL CANCER: NORMAL

## 2025-05-29 NOTE — PROGRESS NOTES
Patient ID: Holli Bales is a 29 y.o. female.    IUD Management    Performed by: Marguerite Rueda MD  Authorized by: Marguerite Rueda MD    Procedure: IUD insertion    Consent obtained by patient, parent, or legal power of  - including discussion of procedure risks and benefits, patient questions answered, and patient education provided: yes    Pregnancy risk: reasonably certain the patient is not pregnant    Pre-Medications:  NSAID  Date/Time of Insertion:  5/30/2025 10:41 AM  Immediately prior to procedure a time out was called: yes    Pelvic exam performed: no    Speculum placed in vagina: yes    Cervix cleaned and prepped: yes    Tenaculum/Allis/Ring Forceps applied to cervix: yes    Anesthesia used: no    Uterus sound depth (cm):  7  Cervix dilated: yes    Cervix dilated with:  Cervical os finder  IUD inserted without complications: yes    OSM: levonorgestrel 20 mcg/24hr  Strings trimmed to (cm):  2  Patient tolerated procedure well: yes    Inserted with ultrasound guidance: no    Transvaginal sono confirmed fundal placement: no    Estimated blood loss (mL):  2  Intended removal date: 8 years

## 2025-05-30 ENCOUNTER — APPOINTMENT (OUTPATIENT)
Dept: OBSTETRICS AND GYNECOLOGY | Facility: CLINIC | Age: 30
End: 2025-05-30
Payer: COMMERCIAL

## 2025-05-30 VITALS — DIASTOLIC BLOOD PRESSURE: 66 MMHG | SYSTOLIC BLOOD PRESSURE: 90 MMHG | WEIGHT: 144 LBS | BODY MASS INDEX: 23.96 KG/M2

## 2025-05-30 DIAGNOSIS — Z11.3 SCREEN FOR STD (SEXUALLY TRANSMITTED DISEASE): ICD-10-CM

## 2025-05-30 DIAGNOSIS — Z32.02 PREGNANCY TEST NEGATIVE: ICD-10-CM

## 2025-05-30 DIAGNOSIS — Z30.430 ENCOUNTER FOR IUD INSERTION: Primary | ICD-10-CM

## 2025-05-30 LAB — PREGNANCY TEST URINE, POC: NEGATIVE

## 2025-05-30 PROCEDURE — 58300 INSERT INTRAUTERINE DEVICE: CPT | Performed by: OBSTETRICS & GYNECOLOGY

## 2025-05-30 PROCEDURE — 81025 URINE PREGNANCY TEST: CPT | Performed by: OBSTETRICS & GYNECOLOGY

## 2025-05-30 RX ORDER — OMEPRAZOLE 20 MG/1
TABLET, ORALLY DISINTEGRATING, DELAYED RELEASE ORAL
COMMUNITY

## 2025-05-31 LAB
C TRACH RRNA SPEC QL NAA+PROBE: NOT DETECTED
N GONORRHOEA RRNA SPEC QL NAA+PROBE: NOT DETECTED
QUEST GC CT AMPLIFIED (ALWAYS MESSAGE): NORMAL
QUEST GC CT AMPLIFIED (ALWAYS MESSAGE): NORMAL
T VAGINALIS RRNA SPEC QL NAA+PROBE: NOT DETECTED

## 2025-06-16 ENCOUNTER — APPOINTMENT (OUTPATIENT)
Dept: PRIMARY CARE | Facility: CLINIC | Age: 30
End: 2025-06-16
Payer: COMMERCIAL

## 2025-06-16 VITALS
HEART RATE: 115 BPM | SYSTOLIC BLOOD PRESSURE: 114 MMHG | BODY MASS INDEX: 23.99 KG/M2 | HEIGHT: 65 IN | DIASTOLIC BLOOD PRESSURE: 70 MMHG | OXYGEN SATURATION: 99 % | WEIGHT: 144 LBS

## 2025-06-16 DIAGNOSIS — G43.109 MIGRAINE WITH AURA AND WITHOUT STATUS MIGRAINOSUS, NOT INTRACTABLE: ICD-10-CM

## 2025-06-16 DIAGNOSIS — J06.9 ACUTE URI: ICD-10-CM

## 2025-06-16 DIAGNOSIS — F33.1 MAJOR DEPRESSIVE DISORDER, RECURRENT, MODERATE: Primary | ICD-10-CM

## 2025-06-16 DIAGNOSIS — R09.81 NASAL SINUS CONGESTION: ICD-10-CM

## 2025-06-16 DIAGNOSIS — J32.0 CHRONIC MAXILLARY SINUSITIS: ICD-10-CM

## 2025-06-16 DIAGNOSIS — K21.9 GASTROESOPHAGEAL REFLUX DISEASE, UNSPECIFIED WHETHER ESOPHAGITIS PRESENT: ICD-10-CM

## 2025-06-16 PROCEDURE — 3008F BODY MASS INDEX DOCD: CPT | Performed by: STUDENT IN AN ORGANIZED HEALTH CARE EDUCATION/TRAINING PROGRAM

## 2025-06-16 PROCEDURE — 99214 OFFICE O/P EST MOD 30 MIN: CPT | Performed by: STUDENT IN AN ORGANIZED HEALTH CARE EDUCATION/TRAINING PROGRAM

## 2025-06-16 PROCEDURE — 1036F TOBACCO NON-USER: CPT | Performed by: STUDENT IN AN ORGANIZED HEALTH CARE EDUCATION/TRAINING PROGRAM

## 2025-06-16 RX ORDER — MONTELUKAST SODIUM 10 MG/1
10 TABLET ORAL DAILY
Qty: 90 TABLET | Refills: 3 | Status: SHIPPED | OUTPATIENT
Start: 2025-06-16

## 2025-06-16 RX ORDER — FLUTICASONE PROPIONATE 50 MCG
1 SPRAY, SUSPENSION (ML) NASAL DAILY
Qty: 16 G | Refills: 5 | Status: SHIPPED | OUTPATIENT
Start: 2025-06-16

## 2025-06-16 RX ORDER — TOPIRAMATE 50 MG/1
50 TABLET, FILM COATED ORAL 2 TIMES DAILY
Qty: 180 TABLET | Refills: 1 | Status: SHIPPED | OUTPATIENT
Start: 2025-06-16

## 2025-06-16 RX ORDER — AZELASTINE 1 MG/ML
1 SPRAY, METERED NASAL 2 TIMES DAILY
Qty: 30 ML | Refills: 3 | Status: SHIPPED | OUTPATIENT
Start: 2025-06-16 | End: 2025-07-16

## 2025-06-16 RX ORDER — FLUOXETINE HYDROCHLORIDE 40 MG/1
40 CAPSULE ORAL DAILY
Qty: 90 CAPSULE | Refills: 1 | Status: SHIPPED | OUTPATIENT
Start: 2025-06-16

## 2025-06-16 RX ORDER — BUPROPION HYDROCHLORIDE 300 MG/1
300 TABLET ORAL
Qty: 90 TABLET | Refills: 1 | Status: SHIPPED | OUTPATIENT
Start: 2025-06-16

## 2025-06-16 RX ORDER — OMEPRAZOLE 20 MG/1
20 CAPSULE, DELAYED RELEASE ORAL
Qty: 90 CAPSULE | Refills: 1 | Status: SHIPPED | OUTPATIENT
Start: 2025-06-16

## 2025-06-16 NOTE — PROGRESS NOTES
Subjective   Patient ID: Holli Bales is a 29 y.o. female who presents for Depression, Migraine, and Nasal Congestion.  Today she is accompanied by alone.     HPI  1.  Depression  At 1 point was following with psychiatry but unfortunately provider left the practice  Continues to take Wellbutrin 300 mg as well as Prozac 40 mg daily as indicated the chart  Denies any HI/SI  Asking if I can take over this medication    2.  Migraine  Continues Topamax/topiramate 50 mg twice daily  Feels well with this medication without any issues/complaints  She also had an IUD placed and stated that this might help her migraines in the future  Wishes to discuss this further    3.  Ovarian cyst  Continues to follow-up with OB/GYN and continues to have lower right quadrant discomfort  Ultimately had an IUD placed and now awaiting on if this is going to get better  Now notices some abdominal bloating and will follow-up with her OB/GYN in the near future    4.  GERD  Did follow-up with otolaryngology in the past and was recommending that she continues to be on omeprazole  Currently taking the medication feels much better  Requesting refills    5.  Nasal congestion  Continues using nasal sprays as indicated chart  Requesting refills of the fluticasone/Astelin    Current Outpatient Medications on File Prior to Visit   Medication Sig Dispense Refill    cetirizine (ZyrTEC) 10 mg tablet Take 1 tablet (10 mg) by mouth once daily at bedtime.      folic acid (Folvite) 1 mg tablet Take Two (2) tablets daily      omeprazole 20 mg tablet,disintegrat, delay rel TAKE 1 CAPSULE BY MOUTH EVERY DAY 30 MINUTES BEFORE MORNING MEAL FOR 30 DAYS for 30      [DISCONTINUED] azelastine (Astelin) 137 mcg (0.1 %) nasal spray Administer 1 spray into each nostril 2 times a day. 30 mL 3    [DISCONTINUED] buPROPion XL (Wellbutrin XL) 300 mg 24 hr tablet Take 1 tablet (300 mg) by mouth once daily in the morning. Take before meals.      [DISCONTINUED] FLUoxetine  "(PROzac) 40 mg capsule Take 1 capsule (40 mg) by mouth once daily.      [DISCONTINUED] fluticasone (Flonase) 50 mcg/actuation nasal spray Administer 1 spray into each nostril once daily. Shake gently. Before first use, prime pump. After use, clean tip and replace cap. 16 g 5    [DISCONTINUED] montelukast (Singulair) 10 mg tablet Take 1 tablet (10 mg) by mouth once daily. 90 tablet 3    [DISCONTINUED] topiramate (Topamax) 50 mg tablet Take 1 tablet (50 mg) by mouth 2 times a day. 180 tablet 1    [DISCONTINUED] traZODone (Desyrel) 300 mg tablet traZODone HCl TABS   Refills: 0       Active (Patient not taking: Reported on 5/2/2025)       No current facility-administered medications on file prior to visit.        Allergies   Allergen Reactions    Turmeric GI Upset, Headache, Hives, Itching, Rash, Shortness of breath and Swelling    Lactase Diarrhea and Hives    Corn Headache, Hives, Itching, Rash and Swelling    Lactose Unknown    Latex Other       Immunization History   Administered Date(s) Administered    HPV, Quadrivalent 11/11/2011, 12/14/2011, 05/30/2012    Influenza, seasonal, injectable 09/23/2020    Meningococcal, Unknown Serogroups 08/10/2016    Moderna SARS-CoV-2 Vaccination 04/02/2021, 05/03/2021, 12/15/2021    Tdap vaccine, age 7 year and older (BOOSTRIX, ADACEL) 06/13/2022    Varicella vaccine, subcutaneous (VARIVAX) 09/21/2010         Review of Systems  All pertinent positive symptoms are included in the history of present illness.  All other systems have been reviewed and are negative and noncontributory to this patient's current ailments.     Objective   /70 (BP Location: Left arm, Patient Position: Sitting, BP Cuff Size: Adult)   Pulse (!) 115   Ht 1.651 m (5' 5\")   Wt 65.3 kg (144 lb)   LMP 04/28/2025 (Approximate)   SpO2 99%   BMI 23.96 kg/m²   BSA: 1.73 meters squared      Physical Exam  CONSTITUTIONAL - well nourished, well developed, looks like stated age, in no acute distress, not " ill-appearing, and not tired appearing  SKIN - normal skin color and pigmentation, normal skin turgor without rash, lesions, or nodules visualized  HEAD - no trauma, normocephalic  EYES - normal external exam  CHEST -no distressed breathing, good effort  EXTREMITIES - no edema, no deformities  NEUROLOGICAL - normal balance, normal motor, no ataxia  PSYCHIATRIC - alert, pleasant and cordial, age-appropriate    Assessment/Plan   1.    Depression  Stable without any changes recommended  Continue Wellbutrin 300 mg alongside Prozac 40 mg    2..  Migraine  Continue topiramate 50 mg twice daily  In the future we may wish to look into Nurtec/Ubrelvy  Continue monitoring symptoms and if needed we will get you to a neurologist    3.  GERD  I did refill your omeprazole continue per the otolaryngology referral/recommendations    4.  Ovarian cyst  I would recommend that you follow-up with your OB/GYN for your well woman visits as well as this ongoing issue  Otherwise, please follow-up for your recent IUD placement as well    5.  Nasal congestion  I did refill your montelukast as you are doing very well with this alongside your nasal sprays  These were sent to your local pharmacy

## 2025-06-30 ENCOUNTER — APPOINTMENT (OUTPATIENT)
Dept: OBSTETRICS AND GYNECOLOGY | Facility: CLINIC | Age: 30
End: 2025-06-30
Payer: COMMERCIAL

## 2025-06-30 VITALS — WEIGHT: 146 LBS | DIASTOLIC BLOOD PRESSURE: 78 MMHG | BODY MASS INDEX: 24.3 KG/M2 | SYSTOLIC BLOOD PRESSURE: 114 MMHG

## 2025-06-30 DIAGNOSIS — Z30.431 CONTRACEPTIVE, SURVEILLANCE, INTRAUTERINE DEVICE: Primary | ICD-10-CM

## 2025-06-30 DIAGNOSIS — Z97.5 PRESENCE OF MIRENA IUD: ICD-10-CM

## 2025-06-30 PROCEDURE — 99213 OFFICE O/P EST LOW 20 MIN: CPT | Performed by: OBSTETRICS & GYNECOLOGY

## 2025-06-30 RX ORDER — LEVONORGESTREL 52 MG/1
1 INTRAUTERINE DEVICE INTRAUTERINE ONCE
COMMUNITY
Start: 2025-05-30

## 2025-06-30 NOTE — PROGRESS NOTES
SUBJECTIVE  Holli Bales is a 29 y.o. female here for  4 week IUD check  Notices light bleeding with occasional cramping.  The patient has never been pregnant.    Gyn:   Menstrual Pattern: regular  STIs: denies  Sexual Activity: men, monogamous, no complaints  Contraception: IUD    [unfilled]  Medical History[1]   Surgical History[2]     OBJECTIVE  /78   Wt 66.2 kg (146 lb)   BMI 24.30 kg/m²      General:   Alert and oriented, in no acute distress   Neck: Supple. No visible thyromegaly.    Abdomen: Soft, non-tender, without masses or organomegaly   Vulva: Normal architecture without erythema, masses, or lesions.    Vagina: Normal mucosa without lesions, masses, or atrophy. No abnormal vaginal discharge.    Cervix: Normal without masses, lesions, or signs of cervicitis, IUD string visible   Uterus: Normal mobile, non-enlarged uterus    Adnexa: Normal without masses or lesions   Pelvic Floor No POP noted. No high tone pelvic floor    Psych Normal affect. Normal mood.      Problem List Items Addressed This Visit       Presence of Mirena IUD    Contraceptive, surveillance, intrauterine device - Primary      IMPRESSIONS:  Mirena IUD in situ, strings visible  Will monitor bleeding, and follow-up as needed.  On a 50% time was utilized for counseling    There are no diagnoses linked to this encounter.     Marguerite Rueda MD         [1]   Past Medical History:  Diagnosis Date    Allergic     Anxiety     Depression     Migraine     Personal history of diseases of the skin and subcutaneous tissue 06/23/2016    History of urticaria    Personal history of other mental and behavioral disorders     History of depression    Personal history of other mental and behavioral disorders 06/23/2016    History of anxiety disorder    Personal history of other specified conditions     History of shortness of breath    Personal history of other specified conditions 06/23/2016    History of headache   [2]   Past Surgical  History:  Procedure Laterality Date    ADENOIDECTOMY  06/03/2015    Adenoidectomy    COLPOSCOPY      MOUTH SURGERY  06/03/2016    Oral Surgery Tooth Extraction    TONSILLECTOMY      TYMPANOSTOMY TUBE PLACEMENT  06/03/2016    Ear Pressure Equalization Tube    WISDOM TOOTH EXTRACTION

## 2025-07-02 PROBLEM — Z30.431 CONTRACEPTIVE, SURVEILLANCE, INTRAUTERINE DEVICE: Status: ACTIVE | Noted: 2025-07-02

## 2025-07-02 PROBLEM — N92.1 MENORRHAGIA WITH IRREGULAR CYCLE: Status: RESOLVED | Noted: 2025-05-02 | Resolved: 2025-07-02

## 2025-07-02 PROBLEM — Z97.5 PRESENCE OF MIRENA IUD: Status: ACTIVE | Noted: 2025-07-02

## 2026-07-31 ENCOUNTER — APPOINTMENT (OUTPATIENT)
Dept: OBSTETRICS AND GYNECOLOGY | Facility: CLINIC | Age: 31
End: 2026-07-31
Payer: COMMERCIAL